# Patient Record
Sex: MALE | Race: WHITE | NOT HISPANIC OR LATINO | Employment: FULL TIME | URBAN - NONMETROPOLITAN AREA
[De-identification: names, ages, dates, MRNs, and addresses within clinical notes are randomized per-mention and may not be internally consistent; named-entity substitution may affect disease eponyms.]

---

## 2017-05-30 ENCOUNTER — TRANSCRIBE ORDERS (OUTPATIENT)
Dept: ADMINISTRATIVE | Facility: HOSPITAL | Age: 47
End: 2017-05-30

## 2017-05-30 DIAGNOSIS — R10.2 PELVIC AND PERINEAL PAIN: Primary | ICD-10-CM

## 2017-05-31 ENCOUNTER — HOSPITAL ENCOUNTER (OUTPATIENT)
Dept: CT IMAGING | Facility: HOSPITAL | Age: 47
Discharge: HOME | End: 2017-05-31
Admitting: NURSE PRACTITIONER

## 2017-05-31 DIAGNOSIS — R10.2 PELVIC AND PERINEAL PAIN: ICD-10-CM

## 2017-05-31 PROCEDURE — 72192 CT PELVIS W/O DYE: CPT | Performed by: RADIOLOGY

## 2017-05-31 PROCEDURE — 72192 CT PELVIS W/O DYE: CPT

## 2017-06-22 ENCOUNTER — OFFICE VISIT (OUTPATIENT)
Dept: CARDIOLOGY | Facility: CLINIC | Age: 47
End: 2017-06-22

## 2017-06-22 VITALS
OXYGEN SATURATION: 97 % | DIASTOLIC BLOOD PRESSURE: 89 MMHG | HEIGHT: 67 IN | WEIGHT: 209.8 LBS | HEART RATE: 82 BPM | BODY MASS INDEX: 32.93 KG/M2 | SYSTOLIC BLOOD PRESSURE: 141 MMHG

## 2017-06-22 DIAGNOSIS — I10 ESSENTIAL HYPERTENSION: ICD-10-CM

## 2017-06-22 DIAGNOSIS — I25.10 CORONARY ARTERY DISEASE INVOLVING NATIVE CORONARY ARTERY OF NATIVE HEART WITHOUT ANGINA PECTORIS: ICD-10-CM

## 2017-06-22 DIAGNOSIS — R06.02 SOB (SHORTNESS OF BREATH) ON EXERTION: ICD-10-CM

## 2017-06-22 PROCEDURE — 93000 ELECTROCARDIOGRAM COMPLETE: CPT | Performed by: PHYSICIAN ASSISTANT

## 2017-06-22 PROCEDURE — 99213 OFFICE O/P EST LOW 20 MIN: CPT | Performed by: PHYSICIAN ASSISTANT

## 2017-06-22 RX ORDER — OMEPRAZOLE 20 MG/1
CAPSULE, DELAYED RELEASE ORAL DAILY
COMMUNITY
Start: 2017-05-26 | End: 2020-01-14

## 2017-06-22 NOTE — PROGRESS NOTES
Problem list     Subjective   Salvador Panda is a 46 y.o. male     Chief Complaint   Patient presents with   • Follow-up     patient appears in office today for follow up    Problem List:  1. Coronary artery disease with history of stenting to the LAD, November 2007.  2. Hypertension      3. Dyslipidemia       HPI  The patient presents back in today for routine evaluation.  Since last appointment, he has done well from cardiac standpoint.  He denies current chest pain.  He has stable dyspnea.  He denies PND or orthopnea.  He has no palpitations, dizziness, or syncope.  He has not been exercising like he has in the past.  With that, he has slowly wrist weight gain.  He also is noted slight increase in blood pressure with weight gain.  He intends on starting exercising again soon.  He does report that he can exert however at moderate levels without any limitation whatsoever from cardiac standpoint.  The patient has no further complaints otherwise.    Current Outpatient Prescriptions   Medication Sig Dispense Refill   • aspirin 325 MG EC tablet Take  by mouth daily.     • Krill Oil 1000 MG capsule Take  by mouth.     • losartan (COZAAR) 25 MG tablet Take 1 tablet by mouth daily. 30 tablet 11   • omeprazole (priLOSEC) 20 MG capsule Daily.     • pravastatin (PRAVACHOL) 40 MG tablet Take 1 tablet by mouth daily. 30 tablet 11     No current facility-administered medications for this visit.        Penicillins and Plavix [clopidogrel]    Past Medical History:   Diagnosis Date   • Coronary artery disease    • Hyperlipidemia    • Hypertension    • Osteoarthritis        Social History     Social History   • Marital status:      Spouse name: N/A   • Number of children: N/A   • Years of education: N/A     Occupational History   • Not on file.     Social History Main Topics   • Smoking status: Never Smoker   • Smokeless tobacco: Never Used   • Alcohol use No   • Drug use: No   • Sexual activity: Defer     Other Topics  "Concern   • Not on file     Social History Narrative    Daily caffeine consumption, 2-3 servings a day       Family History   Problem Relation Age of Onset   • Hyperlipidemia Other    • Coronary artery disease Other      CABG   • Diabetes Other    • Heart attack Other    • Hypertension Mother    • Heart disease Mother    • Hyperlipidemia Mother    • Heart disease Father    • Diabetes Father    • Hypertension Father    • Hyperlipidemia Father        Review of Systems   Constitutional: Negative for fatigue.   HENT: Negative.    Eyes: Negative.  Negative for visual disturbance.   Respiratory: Positive for cough (dry cough). Negative for chest tightness, shortness of breath and wheezing.    Cardiovascular: Negative.  Negative for chest pain, palpitations and leg swelling.   Gastrointestinal: Negative.  Negative for nausea and vomiting.   Endocrine: Negative.  Negative for cold intolerance, heat intolerance, polyphagia and polyuria.   Genitourinary: Negative.  Negative for difficulty urinating, frequency and urgency.   Musculoskeletal: Positive for arthralgias (hips/legs/pelvis). Negative for back pain, myalgias, neck pain and neck stiffness.   Skin: Negative.  Negative for rash and wound.   Allergic/Immunologic: Negative.  Negative for environmental allergies and food allergies.   Neurological: Negative.  Negative for dizziness, weakness, light-headedness, numbness and headaches.   Hematological: Negative.  Does not bruise/bleed easily.   Psychiatric/Behavioral: Negative.  Negative for agitation, confusion and sleep disturbance. The patient is not nervous/anxious.        Objective   /89 (BP Location: Left arm, Patient Position: Sitting)  Pulse 82  Ht 67\" (170.2 cm)  Wt 209 lb 12.8 oz (95.2 kg)  SpO2 97%  BMI 32.86 kg/m2  Lab Results (most recent)     None        Physical Exam   Constitutional: He is oriented to person, place, and time. He appears well-developed and well-nourished. No distress.   HENT:   Head: " Normocephalic and atraumatic.   Eyes: Conjunctivae and EOM are normal. Pupils are equal, round, and reactive to light.   Neck: Normal range of motion. Neck supple. No JVD present. No tracheal deviation present.   Cardiovascular: Normal rate, regular rhythm, normal heart sounds and intact distal pulses.    Pulmonary/Chest: Effort normal and breath sounds normal.   Abdominal: Soft. Bowel sounds are normal. He exhibits no distension and no mass. There is no tenderness. There is no rebound and no guarding.   Musculoskeletal: Normal range of motion. He exhibits no edema, tenderness or deformity.   Neurological: He is alert and oriented to person, place, and time.   Skin: Skin is warm and dry. No rash noted. No erythema. No pallor.   Psychiatric: He has a normal mood and affect. His behavior is normal. Judgment and thought content normal.   Nursing note and vitals reviewed.        Procedure     ECG 12 Lead  Date/Time: 6/22/2017 9:33 AM  Performed by: PRICE GILL  Authorized by: PRICE GILL   Comments: HTN  SOB  Sinus rhythm, left axis deviation, minor nonspecific ST and T-wave changes, no acute changes noted.               Assessment/Plan      Diagnosis Plan   1. Coronary artery disease involving native coronary artery of native heart without angina pectoris     2. SOB (shortness of breath) on exertion  ECG 12 Lead   3. Essential hypertension  ECG 12 Lead     The patient seems to be doing well at this time.  I will make no changes in current medical regimen.  He will call to us for any issues.  Otherwise, we'll see him back in 6 months.

## 2017-08-24 DIAGNOSIS — E78.5 DYSLIPIDEMIA: ICD-10-CM

## 2017-08-24 DIAGNOSIS — I10 ESSENTIAL HYPERTENSION: ICD-10-CM

## 2017-08-24 RX ORDER — PRAVASTATIN SODIUM 40 MG
TABLET ORAL
Qty: 30 TABLET | Refills: 0 | Status: SHIPPED | OUTPATIENT
Start: 2017-08-24 | End: 2017-09-25 | Stop reason: SDUPTHER

## 2017-08-24 RX ORDER — LOSARTAN POTASSIUM 25 MG/1
TABLET ORAL
Qty: 30 TABLET | Refills: 0 | Status: SHIPPED | OUTPATIENT
Start: 2017-08-24 | End: 2017-09-25 | Stop reason: SDUPTHER

## 2017-09-25 DIAGNOSIS — I10 ESSENTIAL HYPERTENSION: ICD-10-CM

## 2017-09-25 DIAGNOSIS — E78.5 DYSLIPIDEMIA: ICD-10-CM

## 2017-09-25 RX ORDER — LOSARTAN POTASSIUM 25 MG/1
TABLET ORAL
Qty: 30 TABLET | Refills: 0 | Status: SHIPPED | OUTPATIENT
Start: 2017-09-25 | End: 2017-10-26 | Stop reason: SDUPTHER

## 2017-09-25 RX ORDER — PRAVASTATIN SODIUM 40 MG
TABLET ORAL
Qty: 30 TABLET | Refills: 0 | Status: SHIPPED | OUTPATIENT
Start: 2017-09-25 | End: 2017-10-26 | Stop reason: SDUPTHER

## 2017-10-26 DIAGNOSIS — E78.5 DYSLIPIDEMIA: ICD-10-CM

## 2017-10-26 DIAGNOSIS — I10 ESSENTIAL HYPERTENSION: ICD-10-CM

## 2017-10-26 RX ORDER — LOSARTAN POTASSIUM 25 MG/1
TABLET ORAL
Qty: 30 TABLET | Refills: 0 | Status: SHIPPED | OUTPATIENT
Start: 2017-10-26 | End: 2017-11-25 | Stop reason: SDUPTHER

## 2017-10-26 RX ORDER — PRAVASTATIN SODIUM 40 MG
TABLET ORAL
Qty: 30 TABLET | Refills: 0 | Status: SHIPPED | OUTPATIENT
Start: 2017-10-26 | End: 2017-11-25 | Stop reason: SDUPTHER

## 2017-11-25 DIAGNOSIS — E78.5 DYSLIPIDEMIA: ICD-10-CM

## 2017-11-25 DIAGNOSIS — I10 ESSENTIAL HYPERTENSION: ICD-10-CM

## 2017-11-26 RX ORDER — LOSARTAN POTASSIUM 25 MG/1
TABLET ORAL
Qty: 90 TABLET | Refills: 3 | Status: SHIPPED | OUTPATIENT
Start: 2017-11-26 | End: 2018-12-01 | Stop reason: SDUPTHER

## 2017-11-26 RX ORDER — PRAVASTATIN SODIUM 40 MG
TABLET ORAL
Qty: 90 TABLET | Refills: 3 | Status: SHIPPED | OUTPATIENT
Start: 2017-11-26 | End: 2018-12-01 | Stop reason: SDUPTHER

## 2018-09-25 ENCOUNTER — OFFICE VISIT (OUTPATIENT)
Dept: CARDIOLOGY | Facility: CLINIC | Age: 48
End: 2018-09-25

## 2018-09-25 VITALS
HEIGHT: 67 IN | WEIGHT: 208.8 LBS | HEART RATE: 68 BPM | SYSTOLIC BLOOD PRESSURE: 127 MMHG | BODY MASS INDEX: 32.77 KG/M2 | DIASTOLIC BLOOD PRESSURE: 78 MMHG | OXYGEN SATURATION: 98 %

## 2018-09-25 DIAGNOSIS — I25.84 CORONARY ARTERY DISEASE DUE TO CALCIFIED CORONARY LESION: ICD-10-CM

## 2018-09-25 DIAGNOSIS — I10 ESSENTIAL HYPERTENSION: Primary | ICD-10-CM

## 2018-09-25 DIAGNOSIS — R42 DIZZINESS: ICD-10-CM

## 2018-09-25 DIAGNOSIS — R06.02 SOB (SHORTNESS OF BREATH): ICD-10-CM

## 2018-09-25 DIAGNOSIS — I25.10 CORONARY ARTERY DISEASE DUE TO CALCIFIED CORONARY LESION: ICD-10-CM

## 2018-09-25 PROCEDURE — 93000 ELECTROCARDIOGRAM COMPLETE: CPT | Performed by: PHYSICIAN ASSISTANT

## 2018-09-25 PROCEDURE — 99213 OFFICE O/P EST LOW 20 MIN: CPT | Performed by: PHYSICIAN ASSISTANT

## 2018-09-25 NOTE — PATIENT INSTRUCTIONS
Heart-Healthy Eating Plan  Many factors influence your heart health, including eating and exercise habits. Heart (coronary) risk increases with abnormal blood fat (lipid) levels. Heart-healthy meal planning includes limiting unhealthy fats, increasing healthy fats, and making other small dietary changes. This includes maintaining a healthy body weight to help keep lipid levels within a normal range.  What is my plan?  Your health care provider recommends that you:  · Get no more than _________% of the total calories in your daily diet from fat.  · Limit your intake of saturated fat to less than _________% of your total calories each day.  · Limit the amount of cholesterol in your diet to less than _________ mg per day.    What types of fat should I choose?  · Choose healthy fats more often. Choose monounsaturated and polyunsaturated fats, such as olive oil and canola oil, flaxseeds, walnuts, almonds, and seeds.  · Eat more omega-3 fats. Good choices include salmon, mackerel, sardines, tuna, flaxseed oil, and ground flaxseeds. Aim to eat fish at least two times each week.  · Limit saturated fats. Saturated fats are primarily found in animal products, such as meats, butter, and cream. Plant sources of saturated fats include palm oil, palm kernel oil, and coconut oil.  · Avoid foods with partially hydrogenated oils in them. These contain trans fats. Examples of foods that contain trans fats are stick margarine, some tub margarines, cookies, crackers, and other baked goods.  What general guidelines do I need to follow?  · Check food labels carefully to identify foods with trans fats or high amounts of saturated fat.  · Fill one half of your plate with vegetables and green salads. Eat 4-5 servings of vegetables per day. A serving of vegetables equals 1 cup of raw leafy vegetables, ½ cup of raw or cooked cut-up vegetables, or ½ cup of vegetable juice.  · Fill one fourth of your plate with whole grains. Look for the word  "\"whole\" as the first word in the ingredient list.  · Fill one fourth of your plate with lean protein foods.  · Eat 4-5 servings of fruit per day. A serving of fruit equals one medium whole fruit, ¼ cup of dried fruit, ½ cup of fresh, frozen, or canned fruit, or ½ cup of 100% fruit juice.  · Eat more foods that contain soluble fiber. Examples of foods that contain this type of fiber are apples, broccoli, carrots, beans, peas, and barley. Aim to get 20-30 g of fiber per day.  · Eat more home-cooked food and less restaurant, buffet, and fast food.  · Limit or avoid alcohol.  · Limit foods that are high in starch and sugar.  · Avoid fried foods.  · Cook foods by using methods other than frying. Baking, boiling, grilling, and broiling are all great options. Other fat-reducing suggestions include:  ? Removing the skin from poultry.  ? Removing all visible fats from meats.  ? Skimming the fat off of stews, soups, and gravies before serving them.  ? Steaming vegetables in water or broth.  · Lose weight if you are overweight. Losing just 5-10% of your initial body weight can help your overall health and prevent diseases such as diabetes and heart disease.  · Increase your consumption of nuts, legumes, and seeds to 4-5 servings per week. One serving of dried beans or legumes equals ½ cup after being cooked, one serving of nuts equals 1½ ounces, and one serving of seeds equals ½ ounce or 1 tablespoon.  · You may need to monitor your salt (sodium) intake, especially if you have high blood pressure. Talk with your health care provider or dietitian to get more information about reducing sodium.  What foods can I eat?  Grains    Breads, including Gambian, white, zaki, wheat, raisin, rye, oatmeal, and Italian. Tortillas that are neither fried nor made with lard or trans fat. Low-fat rolls, including hotdog and hamburger buns and English muffins. Biscuits. Muffins. Waffles. Pancakes. Light popcorn. Whole-grain cereals. Flatbread. " Analy toast. Pretzels. Breadsticks. Rusks. Low-fat snacks and crackers, including oyster, saltine, matzo, greer, animal, and rye. Rice and pasta, including brown rice and those that are made with whole wheat.  Vegetables  All vegetables.  Fruits  All fruits, but limit coconut.  Meats and Other Protein Sources  Lean, well-trimmed beef, veal, pork, and lamb. Chicken and turkey without skin. All fish and shellfish. Wild duck, rabbit, pheasant, and venison. Egg whites or low-cholesterol egg substitutes. Dried beans, peas, lentils, and tofu. Seeds and most nuts.  Dairy  Low-fat or nonfat cheeses, including ricotta, string, and mozzarella. Skim or 1% milk that is liquid, powdered, or evaporated. Buttermilk that is made with low-fat milk. Nonfat or low-fat yogurt.  Beverages  Mineral water. Diet carbonated beverages.  Sweets and Desserts  Sherbets and fruit ices. Honey, jam, marmalade, jelly, and syrups. Meringues and gelatins. Pure sugar candy, such as hard candy, jelly beans, gumdrops, mints, marshmallows, and small amounts of dark chocolate. Armond food cake.  Eat all sweets and desserts in moderation.  Fats and Oils  Nonhydrogenated (trans-free) margarines. Vegetable oils, including soybean, sesame, sunflower, olive, peanut, safflower, corn, canola, and cottonseed. Salad dressings or mayonnaise that are made with a vegetable oil. Limit added fats and oils that you use for cooking, baking, salads, and as spreads.  Other  Cocoa powder. Coffee and tea. All seasonings and condiments.  The items listed above may not be a complete list of recommended foods or beverages. Contact your dietitian for more options.  What foods are not recommended?  Grains  Breads that are made with saturated or trans fats, oils, or whole milk. Croissants. Butter rolls. Cheese breads. Sweet rolls. Donuts. Buttered popcorn. Chow mein noodles. High-fat crackers, such as cheese or butter crackers.  Meats and Other Protein Sources  Fatty meats, such  as hotdogs, short ribs, sausage, spareribs, curiel, ribeye roast or steak, and mutton. High-fat deli meats, such as salami and bologna. Caviar. Domestic duck and goose. Organ meats, such as kidney, liver, sweetbreads, brains, gizzard, chitterlings, and heart.  Dairy  Cream, sour cream, cream cheese, and creamed cottage cheese. Whole milk cheeses, including blue (malachi), [place], Brie, Tayo, American, Havarti, Swiss, cheddar, Camembert, and [place]. Whole or 2% milk that is liquid, evaporated, or condensed. Whole buttermilk. Cream sauce or high-fat cheese sauce. Yogurt that is made from whole milk.  Beverages  Regular sodas and drinks with added sugar.  Sweets and Desserts  Frosting. Pudding. Cookies. Cakes other than terry food cake. Candy that has milk chocolate or white chocolate, hydrogenated fat, butter, coconut, or unknown ingredients. Buttered syrups. Full-fat ice cream or ice cream drinks.  Fats and Oils  Gravy that has suet, meat fat, or shortening. Cocoa butter, hydrogenated oils, palm oil, coconut oil, palm kernel oil. These can often be found in baked products, candy, fried foods, nondairy creamers, and whipped toppings. Solid fats and shortenings, including curiel fat, salt pork, lard, and butter. Nondairy cream substitutes, such as coffee creamers and sour cream substitutes. Salad dressings that are made of unknown oils, cheese, or sour cream.  The items listed above may not be a complete list of foods and beverages to avoid. Contact your dietitian for more information.  This information is not intended to replace advice given to you by your health care provider. Make sure you discuss any questions you have with your health care provider.  Document Released: 09/26/2009 Document Revised: 07/07/2017 Document Reviewed: 06/11/2015  [facility] Interactive Patient Education © 2018 [employer]

## 2018-09-25 NOTE — PROGRESS NOTES
Problem list     Subjective   Salvador Panda is a 48 y.o. male     Chief Complaint   Patient presents with   • Hypertension     presents for folllow up   • Shortness of Breath   • Dizziness   • Coronary Artery Disease       HPI      Problem List:  1. Coronary artery disease with history of stenting to the LAD, November 2007.  2. Hypertension      3. Dyslipidemia    Patient is a 48-year-old male that presents back to the office for follow-up.  He has been doing well.  He has no chest pain or pressure.  Patient does describe having moderate levels of exertional dyspnea.  This apparently has progressed to a mild degree.  He describes to me that prior to stenting in the past, his main complaint was shortness of breath as he had no significant chest pain at that time.  He does not describe any PND orthopnea.    He doesn't palpitate or have dysrhythmic symptoms.  He does complain of significant fatigue.  Describes a lack of energy.  Otherwise is doing well    Outpatient Encounter Prescriptions as of 9/25/2018   Medication Sig Dispense Refill   • aspirin 325 MG EC tablet Take  by mouth daily.     • Krill Oil 1000 MG capsule Take  by mouth.     • losartan (COZAAR) 25 MG tablet TAKE 1 TABLET BY MOUTH DAILY FOR BLOOD PRESSURE 90 tablet 3   • omeprazole (priLOSEC) 20 MG capsule Daily.     • pravastatin (PRAVACHOL) 40 MG tablet TAKE 1 TABLET BY MOUTH DAILY AT BEDTIME FOR CHOLESTEROL 90 tablet 3     No facility-administered encounter medications on file as of 9/25/2018.        Penicillins and Plavix [clopidogrel]    Past Medical History:   Diagnosis Date   • Coronary artery disease    • Hyperlipidemia    • Hypertension    • Osteoarthritis        Social History     Social History   • Marital status:      Spouse name: N/A   • Number of children: N/A   • Years of education: N/A     Occupational History   • Not on file.     Social History Main Topics   • Smoking status: Never Smoker   • Smokeless tobacco: Never Used   •  "Alcohol use No   • Drug use: No   • Sexual activity: Defer     Other Topics Concern   • Not on file     Social History Narrative    Daily caffeine consumption, 2-3 servings a day       Family History   Problem Relation Age of Onset   • Hyperlipidemia Other    • Coronary artery disease Other         CABG   • Diabetes Other    • Heart attack Other    • Hypertension Mother    • Heart disease Mother    • Hyperlipidemia Mother    • Heart disease Father    • Diabetes Father    • Hypertension Father    • Hyperlipidemia Father        Review of Systems   Constitutional: Positive for fatigue.   HENT: Positive for sore throat (acid reflux).    Eyes: Negative.    Respiratory: Positive for shortness of breath (with activity).    Cardiovascular: Negative.  Negative for chest pain, palpitations and leg swelling.   Gastrointestinal: Positive for nausea and vomiting.   Endocrine: Negative.    Genitourinary: Negative.    Musculoskeletal: Positive for myalgias (leg cramps).   Skin: Negative.    Allergic/Immunologic: Negative.    Neurological: Positive for dizziness (in the morning  ) and weakness.   Hematological: Negative.    Psychiatric/Behavioral: Positive for agitation and sleep disturbance (wakes up smothering/soa). The patient is nervous/anxious.    All other systems reviewed and are negative.      Objective   Vitals:    09/25/18 1142   BP: 127/78   BP Location: Left arm   Patient Position: Sitting   Pulse: 68   SpO2: 98%   Weight: 94.7 kg (208 lb 12.8 oz)   Height: 170.2 cm (67\")      /78 (BP Location: Left arm, Patient Position: Sitting)   Pulse 68   Ht 170.2 cm (67\")   Wt 94.7 kg (208 lb 12.8 oz)   SpO2 98%   BMI 32.70 kg/m²     Lab Results (most recent)     None          Physical Exam   Constitutional: He is oriented to person, place, and time. He appears well-developed and well-nourished. No distress.   HENT:   Head: Normocephalic and atraumatic.   Eyes: Pupils are equal, round, and reactive to light. EOM are " normal.   Neck: No JVD present.   Cardiovascular: Normal rate, regular rhythm, normal heart sounds and intact distal pulses.  Exam reveals no gallop and no friction rub.    No murmur heard.  Pulmonary/Chest: Effort normal and breath sounds normal. No respiratory distress. He has no wheezes. He has no rales. He exhibits no tenderness.   Musculoskeletal: Normal range of motion. He exhibits no edema.   Neurological: He is alert and oriented to person, place, and time. No cranial nerve deficit.   Skin: Skin is warm and dry. No rash noted. No erythema. No pallor.   Psychiatric: He has a normal mood and affect. His behavior is normal.   Nursing note and vitals reviewed.      Procedure     ECG 12 Lead  Date/Time: 9/25/2018 11:46 AM  Performed by: JELENA KHAN  Authorized by: JELENA KHAN   Comments: EKG demonstrates sinus rhythm at 62 bpm with no acute ST changes               Assessment/Plan     Problems Addressed this Visit        Cardiovascular and Mediastinum    HTN (hypertension) - Primary    Relevant Orders    ECG 12 Lead       Respiratory    SOB (shortness of breath)    Relevant Orders    ECG 12 Lead      Other Visit Diagnoses     Coronary artery disease due to calcified coronary lesion        Relevant Orders    ECG 12 Lead    Dizziness        Relevant Orders    ECG 12 Lead            Recommendation  1.  I discussed with patient to consider repeat ischemia assessment.  His symptoms prior to stenting in the past with shortness of breath and has progressed to a mild degree.  He does not want to have that done aware of her recommendation.  If symptoms continue to worsen, he will contact our office.  He feels his shortness of breath may be related to physical deconditioning.  2.  We discussed testing for sleep apnea.  He has symptoms concerning and highly suspicious for the same.  He would just like to monitor for now.  We'll see him back for follow-up in 6 months.  Follow-up with primary as scheduled               Patient's Body mass index is 32.7 kg/m². BMI is above normal parameters. Recommendations include: educational material.       Electronically signed by:

## 2018-12-01 DIAGNOSIS — I10 ESSENTIAL HYPERTENSION: ICD-10-CM

## 2018-12-01 DIAGNOSIS — E78.5 DYSLIPIDEMIA: ICD-10-CM

## 2018-12-03 DIAGNOSIS — I10 ESSENTIAL HYPERTENSION: ICD-10-CM

## 2018-12-03 DIAGNOSIS — E78.5 DYSLIPIDEMIA: ICD-10-CM

## 2018-12-03 RX ORDER — PRAVASTATIN SODIUM 40 MG
40 TABLET ORAL NIGHTLY
Qty: 90 TABLET | Refills: 3 | Status: SHIPPED | OUTPATIENT
Start: 2018-12-03 | End: 2019-12-16 | Stop reason: SDUPTHER

## 2018-12-03 RX ORDER — LOSARTAN POTASSIUM 25 MG/1
25 TABLET ORAL DAILY
Qty: 90 TABLET | Refills: 3 | Status: SHIPPED | OUTPATIENT
Start: 2018-12-03 | End: 2019-12-16 | Stop reason: SDUPTHER

## 2018-12-03 RX ORDER — LOSARTAN POTASSIUM 25 MG/1
TABLET ORAL
Qty: 90 TABLET | Refills: 2 | Status: SHIPPED | OUTPATIENT
Start: 2018-12-03 | End: 2018-12-03 | Stop reason: SDUPTHER

## 2018-12-03 RX ORDER — PRAVASTATIN SODIUM 40 MG
TABLET ORAL
Qty: 90 TABLET | Refills: 2 | Status: SHIPPED | OUTPATIENT
Start: 2018-12-03 | End: 2018-12-03 | Stop reason: SDUPTHER

## 2019-12-16 DIAGNOSIS — E78.5 DYSLIPIDEMIA: ICD-10-CM

## 2019-12-16 DIAGNOSIS — I10 ESSENTIAL HYPERTENSION: ICD-10-CM

## 2019-12-16 RX ORDER — PRAVASTATIN SODIUM 40 MG
40 TABLET ORAL NIGHTLY
Qty: 14 TABLET | Refills: 0 | Status: SHIPPED | OUTPATIENT
Start: 2019-12-16 | End: 2020-01-06 | Stop reason: SDUPTHER

## 2019-12-16 RX ORDER — LOSARTAN POTASSIUM 25 MG/1
25 TABLET ORAL DAILY
Qty: 14 TABLET | Refills: 0 | Status: SHIPPED | OUTPATIENT
Start: 2019-12-16 | End: 2020-01-06 | Stop reason: SDUPTHER

## 2019-12-16 NOTE — TELEPHONE ENCOUNTER
Rec'd a faxed request from [facility] for a refill on meds. Patient not seen since 9/2018. Will give 2 weeks of meds with msg that patient needs to schedule appt before further refills.  , ALVINA

## 2020-01-06 DIAGNOSIS — E78.5 DYSLIPIDEMIA: ICD-10-CM

## 2020-01-06 DIAGNOSIS — I10 ESSENTIAL HYPERTENSION: ICD-10-CM

## 2020-01-06 RX ORDER — PRAVASTATIN SODIUM 40 MG
40 TABLET ORAL NIGHTLY
Qty: 14 TABLET | Refills: 0 | Status: SHIPPED | OUTPATIENT
Start: 2020-01-06 | End: 2020-06-29

## 2020-01-06 RX ORDER — LOSARTAN POTASSIUM 25 MG/1
25 TABLET ORAL DAILY
Qty: 14 TABLET | Refills: 0 | Status: SHIPPED | OUTPATIENT
Start: 2020-01-06 | End: 2020-01-20

## 2020-01-06 NOTE — TELEPHONE ENCOUNTER
Rec'd a faxed request from [facility] for a refill on meds. Patient was given a 2 week supply on 12/16/19 with the msg that he needed to schedule an appt since he wasn't seen since 9/2018. Patient is scheduled for 1/14/20. Will give another 2 weeks supply.  ALVINA DE LEON

## 2020-01-14 ENCOUNTER — OFFICE VISIT (OUTPATIENT)
Dept: CARDIOLOGY | Facility: CLINIC | Age: 50
End: 2020-01-14

## 2020-01-14 VITALS
HEIGHT: 67 IN | DIASTOLIC BLOOD PRESSURE: 86 MMHG | OXYGEN SATURATION: 97 % | SYSTOLIC BLOOD PRESSURE: 141 MMHG | HEART RATE: 70 BPM | WEIGHT: 189 LBS | BODY MASS INDEX: 29.66 KG/M2

## 2020-01-14 DIAGNOSIS — E78.5 DYSLIPIDEMIA: ICD-10-CM

## 2020-01-14 DIAGNOSIS — I25.10 CORONARY ARTERY DISEASE INVOLVING NATIVE CORONARY ARTERY OF NATIVE HEART WITHOUT ANGINA PECTORIS: Primary | ICD-10-CM

## 2020-01-14 DIAGNOSIS — I10 ESSENTIAL HYPERTENSION: ICD-10-CM

## 2020-01-14 PROCEDURE — 99213 OFFICE O/P EST LOW 20 MIN: CPT | Performed by: PHYSICIAN ASSISTANT

## 2020-01-14 NOTE — PROGRESS NOTES
Problem list     Subjective   Salvador Panda is a 49 y.o. male     Chief Complaint   Patient presents with   • Hypertension     here for follow up   Problem List:  1. Coronary artery disease with history of stenting to the LAD, November 2007.  2. Hypertension      3. Dyslipidemia    HPI  The patient presents back after being out of the clinic for over a year.  He has been seen in the past because of cardiac history as above.  He has done well over the past year however.  He has gone through some stress recently but that has largely resolved.  The patient reports that outside of that, he really has done well.  He reports no chest pain at this time.  He has stable dyspnea.  He has no PND or orthopnea.  The patient has no dysrhythmic symptoms.  Blood pressures fluctuate but are largely controlled.  Unfortunately, recent lipids suggested a total cholesterol approaching 200.  His LDL was well over 100, close to 120.  Triglycerides are markedly elevated as well for this gentleman.  He reports compliance with pravastatin.  We have discussed consideration for alternate statin therapy.  In the past, the patient cannot tolerate Lipitor, Zocor, or Crestor.    Current Outpatient Medications on File Prior to Visit   Medication Sig Dispense Refill   • aspirin 325 MG EC tablet Take  by mouth daily.     • Krill Oil 1000 MG capsule Take  by mouth.     • losartan (COZAAR) 25 MG tablet Take 1 tablet by mouth Daily. Needs to schedule appt for a follow up 14 tablet 0   • pravastatin (PRAVACHOL) 40 MG tablet Take 1 tablet by mouth Every Night. Needs to schedule appt before further refills 14 tablet 0   • sertraline (ZOLOFT) 50 MG tablet Take 1 tablet by mouth Daily.     • [DISCONTINUED] omeprazole (priLOSEC) 20 MG capsule Daily.       No current facility-administered medications on file prior to visit.        Penicillins and Plavix [clopidogrel]    Past Medical History:   Diagnosis Date   • Coronary artery disease    • Hyperlipidemia      • Hypertension    • Osteoarthritis        Social History     Socioeconomic History   • Marital status:      Spouse name: Not on file   • Number of children: Not on file   • Years of education: Not on file   • Highest education level: Not on file   Tobacco Use   • Smoking status: Never Smoker   • Smokeless tobacco: Never Used   Substance and Sexual Activity   • Alcohol use: No   • Drug use: No   • Sexual activity: Defer   Social History Narrative    Daily caffeine consumption, 2-3 servings a day       Family History   Problem Relation Age of Onset   • Hyperlipidemia Other    • Coronary artery disease Other         CABG   • Diabetes Other    • Heart attack Other    • Hypertension Mother    • Heart disease Mother    • Hyperlipidemia Mother    • Heart disease Father    • Diabetes Father    • Hypertension Father    • Hyperlipidemia Father        Review of Systems   Constitutional: Negative.  Negative for fatigue.   HENT: Negative.  Negative for congestion, rhinorrhea and sore throat.    Eyes: Negative.  Negative for visual disturbance.   Respiratory: Negative.  Negative for chest tightness, shortness of breath and wheezing.    Cardiovascular: Negative.  Negative for chest pain, palpitations and leg swelling.   Gastrointestinal: Positive for abdominal pain (abd pain on right side). Negative for nausea and vomiting.   Endocrine: Negative.  Negative for cold intolerance and heat intolerance.   Genitourinary: Negative.  Negative for difficulty urinating, frequency and urgency.   Musculoskeletal: Negative.  Negative for arthralgias, back pain and neck pain.   Skin: Negative.  Negative for rash and wound.   Allergic/Immunologic: Negative.  Negative for environmental allergies and food allergies.   Neurological: Negative.  Negative for dizziness, syncope and light-headedness.   Hematological: Negative.  Does not bruise/bleed easily.   Psychiatric/Behavioral: Positive for sleep disturbance (wakes up smothering/SOA).  "Negative for agitation and confusion. The patient is nervous/anxious (occasional nervous/anxious).        Objective   Vitals:    01/14/20 1529   BP: 141/86   BP Location: Left arm   Patient Position: Sitting   Pulse: 70   SpO2: 97%   Weight: 85.7 kg (189 lb)   Height: 170.2 cm (67\")      /86 (BP Location: Left arm, Patient Position: Sitting)   Pulse 70   Ht 170.2 cm (67\")   Wt 85.7 kg (189 lb)   SpO2 97%   BMI 29.60 kg/m²    Lab Results (most recent)     None        Physical Exam   Constitutional: He is oriented to person, place, and time. He appears well-developed and well-nourished. No distress.   HENT:   Head: Normocephalic and atraumatic.   Eyes: Pupils are equal, round, and reactive to light. EOM are normal.   Neck: No JVD present.   Cardiovascular: Normal rate, regular rhythm, normal heart sounds and intact distal pulses. Exam reveals no gallop and no friction rub.   No murmur heard.  Pulmonary/Chest: Effort normal and breath sounds normal. No respiratory distress. He has no wheezes. He has no rales. He exhibits no tenderness.   Musculoskeletal: Normal range of motion. He exhibits no edema.   Neurological: He is alert and oriented to person, place, and time. No cranial nerve deficit.   Skin: Skin is warm and dry. No rash noted. No erythema. No pallor.   Psychiatric: He has a normal mood and affect. His behavior is normal.   Nursing note and vitals reviewed.        Procedure   Procedures       Assessment/Plan      Diagnosis Plan   1. Coronary artery disease involving native coronary artery of native heart without angina pectoris     2. Dyslipidemia  Evolocumab solution prefilled syringe 140 mg    Lipid Panel   3. Essential hypertension       1.  At this time, the patient is doing well from cardiovascular standpoint.  He denies symptoms of angina, failure, or dysrhythmias.    2.  I do not like his most recent lipid parameters.  He is not to goal.  He cannot tolerate alternate statin therapy as he is " failed numerous statin therapies as outlined above.  He cannot tolerate higher doses of pravastatin because of recurrence of symptoms.  In that setting, as he needs intensified lipid management, we will start him on injectable cholesterol medications.  That has been sent to his pharmacy.  He will need a repeat lipid panel in 1 to 2 months.    3.  Otherwise, I would continue medical regimen without change.  We will continue to follow lipids closely.  For any issues with hypertensive excursions, he will call immediately.  For breakthrough symptoms, which were reviewed with him today, he will call immediately as well.  Otherwise, we will continue to see him on 6 to 12-month intervals.          Patient's Body mass index is 29.6 kg/m². BMI is above normal parameters. Recommendations include: educational material and referral to primary care.             Electronically signed by:

## 2020-01-14 NOTE — PATIENT INSTRUCTIONS

## 2020-01-18 DIAGNOSIS — I10 ESSENTIAL HYPERTENSION: ICD-10-CM

## 2020-01-20 RX ORDER — LOSARTAN POTASSIUM 25 MG/1
25 TABLET ORAL DAILY
Qty: 90 TABLET | Refills: 3 | Status: SHIPPED | OUTPATIENT
Start: 2020-01-20 | End: 2022-07-07 | Stop reason: SDUPTHER

## 2020-01-23 ENCOUNTER — PRIOR AUTHORIZATION (OUTPATIENT)
Dept: CARDIOLOGY | Facility: CLINIC | Age: 50
End: 2020-01-23

## 2020-01-23 DIAGNOSIS — I25.84 CORONARY ARTERY DISEASE DUE TO CALCIFIED CORONARY LESION: ICD-10-CM

## 2020-01-23 DIAGNOSIS — I25.10 CORONARY ARTERY DISEASE DUE TO CALCIFIED CORONARY LESION: ICD-10-CM

## 2020-01-23 DIAGNOSIS — E78.5 DYSLIPIDEMIA: Primary | ICD-10-CM

## 2020-01-23 NOTE — TELEPHONE ENCOUNTER
Attempted to call patient regarding Repatha PA approval. Patient is eligible for $5 copay card and needs to  card to activate.     Ilia approved Yasmani. Vibha Mcintosh MA

## 2020-01-23 NOTE — TELEPHONE ENCOUNTER
Msg from [facility]'s that they didn't receive rx for injectable cholesterol med. Rx resent. , RMA

## 2020-02-07 ENCOUNTER — TELEPHONE (OUTPATIENT)
Dept: CARDIOLOGY | Facility: CLINIC | Age: 50
End: 2020-02-07

## 2020-02-07 NOTE — TELEPHONE ENCOUNTER
----- Message from PANTERA Cam sent at 2/7/2020  8:38 AM EST -----  The patient was not to goal on pravastatin, therefore that will be additive therapy.  He will continue pravastatin in conjunction with prescribed medication.  ----- Message -----  From: Hannah Dao  Sent: 2/5/2020  10:59 AM EST  To: PANTERA Cam/ [facility] called and stated that pt is unsure if he's supposed to be on Pravastatin and Repatha or just Repatha. She stated that pt needs Rf of Pravastatin, if he is to continue.     Per chart review, both medications are on pt's active rx list. OV note doesn't say whether to stop or continue the Pravastatin.

## 2020-02-07 NOTE — TELEPHONE ENCOUNTER
Called [facility]le stating pt is to be on both Pravastatin and Repatha and to call with any questions.

## 2020-02-14 NOTE — TELEPHONE ENCOUNTER
Patient Instructions by Adriel Blevins RN, BSN at 11/09/18 09:26 AM     Author:  Adriel Blevins RN, BSN Service:  (none) Author Type:  Physician Assistant     Filed:  11/09/18 09:37 AM Encounter Date:  11/9/2018 Status:  Signed     :  Adriel Blevins RN, BSN (Physician Assistant)            1.  Continue same medications.   2.  Continue cardiac low sodium, low cholesterol diet.   3.  Please try to exercise daily.   4.  Follow up with Dr. Correa January 2019.  5.  Let us know if you need anything.   6.  If you experience any worsening or change in symptoms, please seek emergency medical treatment.   7. Radiology scheduling #: [phone].  8. UA and BMP today.   9.  Follow-up with surgeon NP.     Thank you!  Electronically Signed by:    Adriel Blevins RN, BSN , 11/9/2018    Additional Educational Resources:  For additional resources regarding your symptoms, diagnosis, or further health information, please visit the Health Resources section on [email or web address] or the Online Health Resources section in [facility].            Revision History        User Key Date/Time User Provider Type Action    > [N/A] 11/09/18 09:37 AM Adriel Blevins RN, BSN Physician Assistant Sign             Per Ronald MOTT, refill for Pravastatin 40 mg po daily called into [facility] with 4 refills. MO Akila Murillo Mge Card [facility]   Caller: Unspecified (Today,  9:27 AM)              Bailey from [facility] needs to know if pt needs to be on pravastatin as pt has no refills and needs them if he needs to continue taking medication. She can be reached at [phone]

## 2020-03-19 ENCOUNTER — RESULTS ENCOUNTER (OUTPATIENT)
Dept: CARDIOLOGY | Facility: CLINIC | Age: 50
End: 2020-03-19

## 2020-03-19 DIAGNOSIS — E78.5 DYSLIPIDEMIA: ICD-10-CM

## 2020-06-24 ENCOUNTER — TELEPHONE (OUTPATIENT)
Dept: CARDIOLOGY | Facility: CLINIC | Age: 50
End: 2020-06-24

## 2020-06-24 NOTE — TELEPHONE ENCOUNTER
Left message for patient to return call.     Repatha approved until 1/22/2021, labs are not needed until that time. Vibha Mcintosh MA        Patient left message regarding labs needed for Repatha PA.

## 2020-06-27 DIAGNOSIS — E78.5 DYSLIPIDEMIA: ICD-10-CM

## 2020-06-29 RX ORDER — PRAVASTATIN SODIUM 40 MG
TABLET ORAL
Qty: 30 TABLET | Refills: 2 | Status: SHIPPED | OUTPATIENT
Start: 2020-06-29 | End: 2020-10-05

## 2020-06-30 ENCOUNTER — TELEPHONE (OUTPATIENT)
Dept: CARDIOLOGY | Facility: CLINIC | Age: 50
End: 2020-06-30

## 2020-06-30 NOTE — TELEPHONE ENCOUNTER
Patient called stating he thought he needed labs for Repatha prior authorization. Advised Repatha is approved until 1/22/2021. We will repeat labs on his visit in December. Vibha Mcintosh MA

## 2020-07-20 ENCOUNTER — TELEPHONE (OUTPATIENT)
Dept: CARDIOLOGY | Facility: CLINIC | Age: 50
End: 2020-07-20

## 2020-07-20 NOTE — TELEPHONE ENCOUNTER
Ilia now prefers Praluent over Repatha. [facility] shows medication requires a new PA or change to the preferred Paraluent. Verbal order per Ronald Carlton PA-C for Praluent 75 mg. [facility] to notify office if PA is required. Vibha Mcintosh MA      ----- Message from Malathi Humphreys MA sent at 7/20/2020 12:48 PM EDT -----  Regarding: Repatha  Contact: [phone]  Patient left a msg that Repatha needs a PA or switched. You have a msg that Repatha is good thru Jan 2021.

## 2020-10-02 DIAGNOSIS — E78.5 DYSLIPIDEMIA: ICD-10-CM

## 2020-10-05 RX ORDER — PRAVASTATIN SODIUM 40 MG
TABLET ORAL
Qty: 90 TABLET | Refills: 0 | Status: SHIPPED | OUTPATIENT
Start: 2020-10-05 | End: 2021-02-02

## 2021-02-01 ENCOUNTER — PRIOR AUTHORIZATION (OUTPATIENT)
Dept: CARDIOLOGY | Facility: CLINIC | Age: 51
End: 2021-02-01

## 2021-02-02 DIAGNOSIS — E78.5 DYSLIPIDEMIA: ICD-10-CM

## 2021-02-02 RX ORDER — PRAVASTATIN SODIUM 40 MG
TABLET ORAL
Qty: 30 TABLET | Refills: 1 | Status: SHIPPED | OUTPATIENT
Start: 2021-02-02 | End: 2021-04-08

## 2021-04-08 DIAGNOSIS — E78.5 DYSLIPIDEMIA: ICD-10-CM

## 2021-04-08 RX ORDER — PRAVASTATIN SODIUM 40 MG
TABLET ORAL
Qty: 30 TABLET | Refills: 5 | Status: SHIPPED | OUTPATIENT
Start: 2021-04-08 | End: 2021-10-20

## 2021-06-10 DIAGNOSIS — I25.84 CORONARY ARTERY DISEASE DUE TO CALCIFIED CORONARY LESION: ICD-10-CM

## 2021-06-10 DIAGNOSIS — E78.5 DYSLIPIDEMIA: ICD-10-CM

## 2021-06-10 DIAGNOSIS — I25.10 CORONARY ARTERY DISEASE DUE TO CALCIFIED CORONARY LESION: ICD-10-CM

## 2021-06-10 RX ORDER — EVOLOCUMAB 140 MG/ML
INJECTION, SOLUTION SUBCUTANEOUS
Qty: 2 ML | Refills: 11 | OUTPATIENT
Start: 2021-06-10

## 2021-07-22 DIAGNOSIS — E78.5 DYSLIPIDEMIA: ICD-10-CM

## 2021-07-22 DIAGNOSIS — I25.84 CORONARY ARTERY DISEASE DUE TO CALCIFIED CORONARY LESION: Primary | ICD-10-CM

## 2021-07-22 DIAGNOSIS — I25.10 CORONARY ARTERY DISEASE DUE TO CALCIFIED CORONARY LESION: Primary | ICD-10-CM

## 2021-07-22 RX ORDER — ALIROCUMAB 75 MG/ML
INJECTION, SOLUTION SUBCUTANEOUS
Qty: 2 ML | Refills: 5 | Status: SHIPPED | OUTPATIENT
Start: 2021-07-22 | End: 2022-02-25

## 2021-08-13 ENCOUNTER — OFFICE VISIT (OUTPATIENT)
Dept: CARDIOLOGY | Facility: CLINIC | Age: 51
End: 2021-08-13

## 2021-08-13 VITALS
WEIGHT: 204 LBS | HEIGHT: 67 IN | HEART RATE: 66 BPM | DIASTOLIC BLOOD PRESSURE: 80 MMHG | OXYGEN SATURATION: 97 % | BODY MASS INDEX: 32.02 KG/M2 | SYSTOLIC BLOOD PRESSURE: 122 MMHG

## 2021-08-13 DIAGNOSIS — R07.2 PRECORDIAL PAIN: ICD-10-CM

## 2021-08-13 DIAGNOSIS — I25.119 CORONARY ARTERY DISEASE INVOLVING NATIVE CORONARY ARTERY OF NATIVE HEART WITH ANGINA PECTORIS (HCC): Primary | ICD-10-CM

## 2021-08-13 DIAGNOSIS — R06.02 SHORTNESS OF BREATH: ICD-10-CM

## 2021-08-13 PROCEDURE — 93000 ELECTROCARDIOGRAM COMPLETE: CPT | Performed by: PHYSICIAN ASSISTANT

## 2021-08-13 PROCEDURE — 99213 OFFICE O/P EST LOW 20 MIN: CPT | Performed by: PHYSICIAN ASSISTANT

## 2021-08-13 RX ORDER — PANTOPRAZOLE SODIUM 40 MG/1
40 TABLET, DELAYED RELEASE ORAL DAILY
COMMUNITY

## 2021-08-13 NOTE — PATIENT INSTRUCTIONS

## 2021-08-13 NOTE — PROGRESS NOTES
Problem list     Subjective   Salvador Panda is a 51 y.o. male     Chief Complaint   Patient presents with   • Follow-up     11 months   Problem List:  1. Coronary artery disease with history of stenting to the LAD, November 2007.  2. Hypertension      3. Dyslipidemia    HPI    The patient presents back to the clinic today for routine evaluation and follow-up. We have followed him historically given coronary artery disease history as above. The patient is not been in the clinic in almost a year. He presents back today just to reestablish care, but also because of her recent symptoms. He tells me that while exerting at home, he had onset of mid back discomfort. This had referral through to the mid chest. This was concerning for him, to the point where he rested. Symptoms eventually resolved, but he is now being seen just to ensure no significant abnormalities noted. The patient has ongoing dyspnea which is stable and mostly nonlimiting. He has no failure or dysrhythmic symptoms. Blood pressures have been reasonably controlled since his primary increase losartan at 50 mg daily. Most recent lipid parameters indicate adequate control with prior 1 pravastatin. He has a degree of hypertriglyceridemia, but intends on intensifying lifestyle changes to address that.  Current Outpatient Medications on File Prior to Visit   Medication Sig Dispense Refill   • aspirin 325 MG EC tablet Take  by mouth daily.     • Krill Oil 1000 MG capsule Take  by mouth.     • losartan (COZAAR) 25 MG tablet Take 1 tablet by mouth Daily. (Patient taking differently: Take 50 mg by mouth Daily.) 90 tablet 3   • pantoprazole (PROTONIX) 40 MG EC tablet Take 40 mg by mouth Daily.     • Praluent 75 MG/ML solution auto-injector INJECT 75 MG UNDER SKIN EVERY 14 DAYS 2 mL 5   • pravastatin (PRAVACHOL) 40 MG tablet TAKE 1 TABLET BY MOUTH ONCE DAILY 30 tablet 5   • sertraline (ZOLOFT) 50 MG tablet Take 1 tablet by mouth Daily.       No current  facility-administered medications on file prior to visit.       Plavix [clopidogrel] and Penicillins    Past Medical History:   Diagnosis Date   • Coronary artery disease    • Hyperlipidemia    • Hypertension    • Osteoarthritis        Social History     Socioeconomic History   • Marital status:      Spouse name: Not on file   • Number of children: Not on file   • Years of education: Not on file   • Highest education level: Not on file   Tobacco Use   • Smoking status: Never Smoker   • Smokeless tobacco: Never Used   Substance and Sexual Activity   • Alcohol use: No   • Drug use: No   • Sexual activity: Defer       Family History   Problem Relation Age of Onset   • Hyperlipidemia Other    • Coronary artery disease Other         CABG   • Diabetes Other    • Heart attack Other    • Hypertension Mother    • Heart disease Mother    • Hyperlipidemia Mother    • Heart disease Father    • Diabetes Father    • Hypertension Father    • Hyperlipidemia Father        Review of Systems   Constitutional: Negative.  Negative for chills, fatigue and fever.   HENT: Negative for congestion, rhinorrhea and sore throat.    Eyes: Negative.  Negative for visual disturbance.   Respiratory: Positive for chest tightness and shortness of breath. Negative for wheezing.    Cardiovascular: Negative for chest pain, palpitations and leg swelling.   Gastrointestinal: Negative.    Endocrine: Negative.    Genitourinary: Negative.    Musculoskeletal: Positive for back pain and neck pain. Negative for arthralgias.   Skin: Negative.  Negative for rash and wound.   Allergic/Immunologic: Positive for environmental allergies.   Neurological: Positive for headaches. Negative for dizziness, weakness and numbness.   Hematological: Negative.  Does not bruise/bleed easily.   Psychiatric/Behavioral: Positive for sleep disturbance (falling / staying asleeep ).       Objective   Vitals:    08/13/21 0900   BP: 122/80   BP Location: Left arm   Patient  "Position: Sitting   Pulse: 66   SpO2: 97%   Weight: 92.5 kg (204 lb)   Height: 170.2 cm (67\")      /80 (BP Location: Left arm, Patient Position: Sitting)   Pulse 66   Ht 170.2 cm (67\")   Wt 92.5 kg (204 lb)   SpO2 97%   BMI 31.95 kg/m²    Lab Results (most recent)     None        Physical Exam  Vitals and nursing note reviewed.   Constitutional:       General: He is not in acute distress.     Appearance: He is well-developed.   HENT:      Head: Normocephalic and atraumatic.   Eyes:      Conjunctiva/sclera: Conjunctivae normal.      Pupils: Pupils are equal, round, and reactive to light.   Neck:      Vascular: No JVD.      Trachea: No tracheal deviation.   Cardiovascular:      Rate and Rhythm: Normal rate and regular rhythm.      Heart sounds: Normal heart sounds.   Pulmonary:      Effort: Pulmonary effort is normal.      Breath sounds: Normal breath sounds.   Abdominal:      General: Bowel sounds are normal. There is no distension.      Palpations: Abdomen is soft. There is no mass.      Tenderness: There is no abdominal tenderness. There is no guarding or rebound.   Musculoskeletal:         General: No tenderness or deformity. Normal range of motion.      Cervical back: Normal range of motion and neck supple.   Skin:     General: Skin is warm and dry.      Coloration: Skin is not pale.      Findings: No erythema or rash.   Neurological:      Mental Status: He is alert and oriented to person, place, and time.   Psychiatric:         Behavior: Behavior normal.         Thought Content: Thought content normal.         Judgment: Judgment normal.           Procedure     ECG 12 Lead    Date/Time: 8/13/2021 9:05 AM  Performed by: Ronald Carlton PA  Authorized by: Ronald Carlton PA   Comparison: compared with previous ECG from 1/2/2020  Comparison to previous ECG: Sinus rhythm, rate 54, no acute changes noted. The patient does have diffuse nonspecific ST-T wave changes.                 Assessment/Plan      " Diagnosis Plan   1. Coronary artery disease involving native coronary artery of native heart with angina pectoris (CMS/HCC)     2. Precordial pain     3. Shortness of breath       1. I would like to repeat cardiac evaluation. The patient describes a recent episode of chest discomfort, all as outlined above. With cardiac history, I feel he needs evaluation with a stress test. We will schedule for treadmill nuclear stress testing in that setting.    2. We will schedule for an echo as well to evaluate LV size and function, valvular morphologies, and cardiac structure otherwise.    3. Blood pressures have improved since recent increase of losartan therapy with the patient's primary care provider. He will continue to monitor that and call for any complications.    4. Recent lipid parameters suggest largely normal lipid findings with the exception of hypertriglyceridemia. I would continue prior 1 pravastatin without change. He will institute lifestyle modifications to address his degree of hypertriglyceridemia.    5. I would make no adjustments. We will see him back to review results of the above and recommend him further. He will call for any complications.                   Electronically signed by:

## 2021-10-20 DIAGNOSIS — E78.5 DYSLIPIDEMIA: ICD-10-CM

## 2021-10-20 RX ORDER — PRAVASTATIN SODIUM 40 MG
TABLET ORAL
Qty: 30 TABLET | Refills: 5 | Status: SHIPPED | OUTPATIENT
Start: 2021-10-20 | End: 2022-04-19

## 2021-11-23 ENCOUNTER — TELEPHONE (OUTPATIENT)
Dept: CARDIOLOGY | Facility: CLINIC | Age: 51
End: 2021-11-23

## 2021-11-23 DIAGNOSIS — R07.2 PRECORDIAL PAIN: Primary | ICD-10-CM

## 2021-11-23 DIAGNOSIS — R06.02 SHORTNESS OF BREATH: ICD-10-CM

## 2021-11-23 NOTE — TELEPHONE ENCOUNTER
Pt called stated that pt was having chest pain,  Nausea, dizziness, and tingling in Left arm. Stated that he was supposed to have been scheduled for Echo and Stress test never got a phone call with appts.    Advised  Pt. to go to the ER  To be evaluated. Pt refused said that his symptoms were from Friday night and that he felt better. He stated he just wanted his Stress and Echo appts. I advised if symptoms return or worsen to go to the ER for evaluation. I also advised pt the scheduling would be calling him back with the appts.

## 2022-01-06 ENCOUNTER — APPOINTMENT (OUTPATIENT)
Dept: CARDIOLOGY | Facility: HOSPITAL | Age: 52
End: 2022-01-06

## 2022-02-08 ENCOUNTER — HOSPITAL ENCOUNTER (OUTPATIENT)
Dept: CARDIOLOGY | Facility: HOSPITAL | Age: 52
Discharge: HOME | End: 2022-02-08

## 2022-02-08 DIAGNOSIS — R06.02 SHORTNESS OF BREATH: ICD-10-CM

## 2022-02-08 DIAGNOSIS — R07.2 PRECORDIAL PAIN: ICD-10-CM

## 2022-02-08 PROCEDURE — 93306 TTE W/DOPPLER COMPLETE: CPT | Performed by: SPECIALIST

## 2022-02-08 PROCEDURE — 25010000002 REGADENOSON 0.4 MG/5ML SOLUTION: Performed by: INTERNAL MEDICINE

## 2022-02-08 PROCEDURE — A9500 TC99M SESTAMIBI: HCPCS | Performed by: INTERNAL MEDICINE

## 2022-02-08 PROCEDURE — 25010000002 AMINOPHYLLINE PER 250 MG: Performed by: INTERNAL MEDICINE

## 2022-02-08 PROCEDURE — 78452 HT MUSCLE IMAGE SPECT MULT: CPT

## 2022-02-08 PROCEDURE — 93018 CV STRESS TEST I&R ONLY: CPT | Performed by: INTERNAL MEDICINE

## 2022-02-08 PROCEDURE — 0 TECHNETIUM SESTAMIBI: Performed by: INTERNAL MEDICINE

## 2022-02-08 PROCEDURE — 93306 TTE W/DOPPLER COMPLETE: CPT

## 2022-02-08 PROCEDURE — 93017 CV STRESS TEST TRACING ONLY: CPT

## 2022-02-08 PROCEDURE — 78452 HT MUSCLE IMAGE SPECT MULT: CPT | Performed by: INTERNAL MEDICINE

## 2022-02-08 RX ORDER — AMINOPHYLLINE DIHYDRATE 25 MG/ML
125 INJECTION, SOLUTION INTRAVENOUS
Status: COMPLETED | OUTPATIENT
Start: 2022-02-08 | End: 2022-02-08

## 2022-02-08 RX ADMIN — REGADENOSON 0.4 MG: 0.08 INJECTION, SOLUTION INTRAVENOUS at 10:10

## 2022-02-08 RX ADMIN — AMINOPHYLLINE 125 MG: 25 INJECTION, SOLUTION INTRAVENOUS at 10:11

## 2022-02-08 RX ADMIN — TECHNETIUM TC 99M SESTAMIBI 1 DOSE: 1 INJECTION INTRAVENOUS at 08:17

## 2022-02-08 RX ADMIN — TECHNETIUM TC 99M SESTAMIBI 1 DOSE: 1 INJECTION INTRAVENOUS at 10:11

## 2022-02-10 LAB
BH CV REST NUCLEAR ISOTOPE DOSE: 10 MCI
BH CV STRESS COMMENTS STAGE 1: NORMAL
BH CV STRESS DOSE REGADENOSON STAGE 1: 0.4
BH CV STRESS DURATION MIN STAGE 1: 0
BH CV STRESS DURATION SEC STAGE 1: 10
BH CV STRESS NUCLEAR ISOTOPE DOSE: 30 MCI
BH CV STRESS PROTOCOL 1: NORMAL
BH CV STRESS RECOVERY BP: NORMAL MMHG
BH CV STRESS RECOVERY HR: 59 BPM
BH CV STRESS STAGE 1: 1
MAXIMAL PREDICTED HEART RATE: 169 BPM
PERCENT MAX PREDICTED HR: 37.28 %
STRESS BASELINE BP: NORMAL MMHG
STRESS BASELINE HR: 53 BPM
STRESS PERCENT HR: 44 %
STRESS POST PEAK BP: NORMAL MMHG
STRESS POST PEAK HR: 63 BPM
STRESS TARGET HR: 144 BPM

## 2022-02-15 ENCOUNTER — TELEPHONE (OUTPATIENT)
Dept: CARDIOLOGY | Facility: CLINIC | Age: 52
End: 2022-02-15

## 2022-02-15 NOTE — TELEPHONE ENCOUNTER
Ronald  Patient said that they were called this morning about abn test results . I see where patient had abnormal stress. What is your plan of action and I can call them back please?

## 2022-02-17 ENCOUNTER — OFFICE VISIT (OUTPATIENT)
Dept: CARDIOLOGY | Facility: CLINIC | Age: 52
End: 2022-02-17

## 2022-02-17 VITALS
SYSTOLIC BLOOD PRESSURE: 143 MMHG | WEIGHT: 206 LBS | BODY MASS INDEX: 32.33 KG/M2 | DIASTOLIC BLOOD PRESSURE: 95 MMHG | HEART RATE: 68 BPM | HEIGHT: 67 IN | OXYGEN SATURATION: 96 %

## 2022-02-17 DIAGNOSIS — I10 PRIMARY HYPERTENSION: ICD-10-CM

## 2022-02-17 DIAGNOSIS — R06.02 SHORTNESS OF BREATH: ICD-10-CM

## 2022-02-17 DIAGNOSIS — I25.10 CORONARY ARTERY DISEASE INVOLVING NATIVE CORONARY ARTERY OF NATIVE HEART WITHOUT ANGINA PECTORIS: Primary | ICD-10-CM

## 2022-02-17 LAB
BH CV ECHO MEAS - ACS: 2.5 CM
BH CV ECHO MEAS - AO MAX PG: 5.9 MMHG
BH CV ECHO MEAS - AO MEAN PG: 3 MMHG
BH CV ECHO MEAS - AO ROOT AREA (BSA CORRECTED): 1.7
BH CV ECHO MEAS - AO ROOT AREA: 9.6 CM^2
BH CV ECHO MEAS - AO ROOT DIAM: 3.5 CM
BH CV ECHO MEAS - AO V2 MAX: 121 CM/SEC
BH CV ECHO MEAS - AO V2 MEAN: 85.6 CM/SEC
BH CV ECHO MEAS - AO V2 VTI: 22.9 CM
BH CV ECHO MEAS - BSA(HAYCOCK): 2.1 M^2
BH CV ECHO MEAS - BSA: 2 M^2
BH CV ECHO MEAS - BZI_BMI: 32 KILOGRAMS/M^2
BH CV ECHO MEAS - BZI_METRIC_HEIGHT: 170.2 CM
BH CV ECHO MEAS - BZI_METRIC_WEIGHT: 92.5 KG
BH CV ECHO MEAS - EDV(CUBED): 60.7 ML
BH CV ECHO MEAS - EDV(MOD-SP4): 62.8 ML
BH CV ECHO MEAS - EDV(TEICH): 67.1 ML
BH CV ECHO MEAS - EF(CUBED): 78.9 %
BH CV ECHO MEAS - EF(MOD-SP4): 54.1 %
BH CV ECHO MEAS - EF(TEICH): 71.8 %
BH CV ECHO MEAS - EF_3D-VOL: 57 %
BH CV ECHO MEAS - ESV(CUBED): 12.8 ML
BH CV ECHO MEAS - ESV(MOD-SP4): 28.8 ML
BH CV ECHO MEAS - ESV(TEICH): 18.9 ML
BH CV ECHO MEAS - FS: 40.5 %
BH CV ECHO MEAS - IVS/LVPW: 1.3
BH CV ECHO MEAS - IVSD: 1.2 CM
BH CV ECHO MEAS - LA DIMENSION: 3.4 CM
BH CV ECHO MEAS - LA/AO: 0.97
BH CV ECHO MEAS - LV DIASTOLIC VOL/BSA (35-75): 30.8 ML/M^2
BH CV ECHO MEAS - LV IVRT: 0.14 SEC
BH CV ECHO MEAS - LV MASS(C)D: 134.2 GRAMS
BH CV ECHO MEAS - LV MASS(C)DI: 65.8 GRAMS/M^2
BH CV ECHO MEAS - LV SYSTOLIC VOL/BSA (12-30): 14.1 ML/M^2
BH CV ECHO MEAS - LVIDD: 3.9 CM
BH CV ECHO MEAS - LVIDS: 2.3 CM
BH CV ECHO MEAS - LVLD AP4: 6.4 CM
BH CV ECHO MEAS - LVLS AP4: 5.1 CM
BH CV ECHO MEAS - LVOT AREA (M): 4.2 CM^2
BH CV ECHO MEAS - LVOT AREA: 4.2 CM^2
BH CV ECHO MEAS - LVOT DIAM: 2.3 CM
BH CV ECHO MEAS - LVPWD: 0.94 CM
BH CV ECHO MEAS - MV A MAX VEL: 59.1 CM/SEC
BH CV ECHO MEAS - MV DEC SLOPE: 179 CM/SEC^2
BH CV ECHO MEAS - MV E MAX VEL: 51.4 CM/SEC
BH CV ECHO MEAS - MV E/A: 0.87
BH CV ECHO MEAS - RVDD: 3.3 CM
BH CV ECHO MEAS - SI(AO): 108 ML/M^2
BH CV ECHO MEAS - SI(CUBED): 23.5 ML/M^2
BH CV ECHO MEAS - SI(MOD-SP4): 16.7 ML/M^2
BH CV ECHO MEAS - SI(TEICH): 23.6 ML/M^2
BH CV ECHO MEAS - SV(AO): 220.3 ML
BH CV ECHO MEAS - SV(CUBED): 47.9 ML
BH CV ECHO MEAS - SV(MOD-SP4): 34 ML
BH CV ECHO MEAS - SV(TEICH): 48.2 ML
MAXIMAL PREDICTED HEART RATE: 169 BPM
STRESS TARGET HR: 144 BPM

## 2022-02-17 PROCEDURE — 99213 OFFICE O/P EST LOW 20 MIN: CPT | Performed by: PHYSICIAN ASSISTANT

## 2022-02-17 NOTE — PATIENT INSTRUCTIONS

## 2022-02-17 NOTE — PROGRESS NOTES
Problem list     Subjective   Salvador Panda is a 51 y.o. male     Chief Complaint   Patient presents with   • Follow-up     ABN stress nuclear    Problem List:  1. Coronary artery disease with history of stenting to the LAD, November 2007.  2. Hypertension      3. Dyslipidemia    HPI    The patient presents in the clinic today at our request to review stress test findings.  Because of symptoms reported at last evaluation, not symptoms he had experience with stenting previously, he was scheduled for noninvasive cardiac evaluation.  Echocardiogram prelim only is available today.  His stress test however supports a mild anterolateral wall segment of ischemia.  Clinically, the patient now feels well.  He experiences no continuation of chest pain.  He reports stable dyspnea.  He reports no failure nor dysrhythmic symptoms.  He again reports no symptoms whatsoever with which he experienced prior to stenting in 2007.  Currently, the patient feels that he is doing well.  Current Outpatient Medications on File Prior to Visit   Medication Sig Dispense Refill   • aspirin 325 MG EC tablet Take  by mouth daily.     • Krill Oil 1000 MG capsule Take  by mouth.     • losartan (COZAAR) 25 MG tablet Take 1 tablet by mouth Daily. (Patient taking differently: Take 50 mg by mouth Daily.) 90 tablet 3   • pantoprazole (PROTONIX) 40 MG EC tablet Take 40 mg by mouth Daily.     • Praluent 75 MG/ML solution auto-injector INJECT 75 MG UNDER SKIN EVERY 14 DAYS 2 mL 5   • pravastatin (PRAVACHOL) 40 MG tablet TAKE 1 TABLET BY MOUTH ONCE DAILY 30 tablet 5   • sertraline (ZOLOFT) 50 MG tablet Take 1 tablet by mouth Daily.       No current facility-administered medications on file prior to visit.       Plavix [clopidogrel] and Penicillins    Past Medical History:   Diagnosis Date   • Coronary artery disease    • Hyperlipidemia    • Hypertension    • Osteoarthritis        Social History     Socioeconomic History   • Marital status:   "  Tobacco Use   • Smoking status: Never Smoker   • Smokeless tobacco: Never Used   Substance and Sexual Activity   • Alcohol use: No   • Drug use: No   • Sexual activity: Defer       Family History   Problem Relation Age of Onset   • Hyperlipidemia Other    • Coronary artery disease Other         CABG   • Diabetes Other    • Heart attack Other    • Hypertension Mother    • Heart disease Mother    • Hyperlipidemia Mother    • Heart disease Father    • Diabetes Father    • Hypertension Father    • Hyperlipidemia Father        Review of Systems   Constitutional: Negative.  Negative for chills, fatigue and fever.   HENT: Negative.  Negative for congestion, rhinorrhea and sore throat.    Eyes: Negative.  Negative for visual disturbance.   Respiratory: Negative.  Negative for chest tightness, shortness of breath and wheezing.    Cardiovascular: Negative.  Negative for chest pain, palpitations and leg swelling.   Gastrointestinal: Negative.    Endocrine: Negative.    Genitourinary: Negative.    Musculoskeletal: Negative.  Negative for arthralgias, back pain and neck pain.   Skin: Negative.  Negative for rash and wound.   Allergic/Immunologic: Positive for environmental allergies.   Neurological: Negative.  Negative for dizziness, weakness, numbness and headaches.   Hematological: Negative.  Does not bruise/bleed easily.   Psychiatric/Behavioral: Negative.  Negative for sleep disturbance.       Objective   Vitals:    02/17/22 0848   BP: 143/95   BP Location: Left arm   Patient Position: Sitting   Pulse: 68   SpO2: 96%   Weight: 93.4 kg (206 lb)   Height: 170.2 cm (67\")      /95 (BP Location: Left arm, Patient Position: Sitting)   Pulse 68   Ht 170.2 cm (67\")   Wt 93.4 kg (206 lb)   SpO2 96%   BMI 32.26 kg/m²    Lab Results (most recent)     None        Physical Exam  Vitals and nursing note reviewed.   Constitutional:       General: He is not in acute distress.     Appearance: He is well-developed.   HENT:      " Head: Normocephalic and atraumatic.   Eyes:      Conjunctiva/sclera: Conjunctivae normal.      Pupils: Pupils are equal, round, and reactive to light.   Neck:      Vascular: No JVD.      Trachea: No tracheal deviation.   Cardiovascular:      Rate and Rhythm: Normal rate and regular rhythm.      Heart sounds: Normal heart sounds.   Pulmonary:      Effort: Pulmonary effort is normal.      Breath sounds: Normal breath sounds.   Abdominal:      General: Bowel sounds are normal. There is no distension.      Palpations: Abdomen is soft. There is no mass.      Tenderness: There is no abdominal tenderness. There is no guarding or rebound.   Musculoskeletal:         General: No tenderness or deformity. Normal range of motion.      Cervical back: Normal range of motion and neck supple.   Skin:     General: Skin is warm and dry.      Coloration: Skin is not pale.      Findings: No erythema or rash.   Neurological:      Mental Status: He is alert and oriented to person, place, and time.   Psychiatric:         Behavior: Behavior normal.         Thought Content: Thought content normal.         Judgment: Judgment normal.           Procedure   Procedures       Assessment/Plan      Diagnosis Plan   1. Coronary artery disease involving native coronary artery of native heart without angina pectoris     2. Shortness of breath     3. Primary hypertension       1.  The patient presents today to review stress test findings.  Stress test questions a small segment of anterolateral wall ischemia.  We have reviewed this in detail with the patient.  He is asymptomatic from cardiovascular standpoint.  Given stable clinical course and nonhigh risk stress test, we have collectively agreed to proceed with medical management and clinical monitoring.    2.  For breakthrough symptoms, he will have to be considered for catheterization at that time.  For now, I would continue medications without change.    3.  We will see him back in 2 to 4 months just  for repeat symptom check.  Again, he will call for complications prior to that.    4.  He is hypertensive today.  He feels that this is related more to stress.  He is going to monitor that at home.  If needed, I will increase losartan dosing.                          Electronically signed by:

## 2022-02-24 ENCOUNTER — TELEPHONE (OUTPATIENT)
Dept: CARDIOLOGY | Facility: CLINIC | Age: 52
End: 2022-02-24

## 2022-02-24 NOTE — TELEPHONE ENCOUNTER
Patient  would like Rx for Nitro to keep on hand if needed. He is not having any symptoms at this time.    Ok per Ronald MOTT to call in Nitro. Sent into patient pharmacy.

## 2022-02-25 DIAGNOSIS — I25.84 CORONARY ARTERY DISEASE DUE TO CALCIFIED CORONARY LESION: ICD-10-CM

## 2022-02-25 DIAGNOSIS — E78.5 DYSLIPIDEMIA: ICD-10-CM

## 2022-02-25 DIAGNOSIS — I25.10 CORONARY ARTERY DISEASE DUE TO CALCIFIED CORONARY LESION: ICD-10-CM

## 2022-02-25 RX ORDER — ALIROCUMAB 75 MG/ML
INJECTION, SOLUTION SUBCUTANEOUS
Qty: 6 PEN | Refills: 3 | Status: SHIPPED | OUTPATIENT
Start: 2022-02-25 | End: 2023-03-22

## 2022-02-25 RX ORDER — NITROGLYCERIN 0.4 MG/1
TABLET SUBLINGUAL
Qty: 100 TABLET | Refills: 11 | Status: SHIPPED | OUTPATIENT
Start: 2022-02-25

## 2022-03-02 ENCOUNTER — PRIOR AUTHORIZATION (OUTPATIENT)
Dept: CARDIOLOGY | Facility: CLINIC | Age: 52
End: 2022-03-02

## 2022-03-02 NOTE — TELEPHONE ENCOUNTER
Praluent PA approval.  Message from [employer]...Your PA request has been approved. Additional information will be provided in the approval communication

## 2022-04-19 DIAGNOSIS — E78.5 DYSLIPIDEMIA: ICD-10-CM

## 2022-04-19 RX ORDER — PRAVASTATIN SODIUM 40 MG
TABLET ORAL
Qty: 30 TABLET | Refills: 5 | Status: SHIPPED | OUTPATIENT
Start: 2022-04-19 | End: 2022-11-08

## 2022-07-07 ENCOUNTER — TELEPHONE (OUTPATIENT)
Dept: CARDIOLOGY | Facility: CLINIC | Age: 52
End: 2022-07-07

## 2022-07-07 DIAGNOSIS — I10 ESSENTIAL HYPERTENSION: ICD-10-CM

## 2022-07-07 RX ORDER — LOSARTAN POTASSIUM 50 MG/1
50 TABLET ORAL DAILY
Qty: 30 TABLET | Refills: 5 | Status: SHIPPED | OUTPATIENT
Start: 2022-07-07

## 2022-07-07 NOTE — TELEPHONE ENCOUNTER
Patients [employer]friends called requestion medication refil.    Called and spoke with patient, he needs refill of Losartan 50 mg daily (Also called and confirmed with px ,this is the amount he has been taking) Patient notified sent to [facility].      RYLIE RUIZ MA

## 2022-11-08 DIAGNOSIS — E78.5 DYSLIPIDEMIA: ICD-10-CM

## 2022-11-08 RX ORDER — PRAVASTATIN SODIUM 40 MG
TABLET ORAL
Qty: 30 TABLET | Refills: 5 | Status: SHIPPED | OUTPATIENT
Start: 2022-11-08

## 2023-02-01 ENCOUNTER — PRIOR AUTHORIZATION (OUTPATIENT)
Dept: CARDIOLOGY | Facility: CLINIC | Age: 53
End: 2023-02-01
Payer: COMMERCIAL

## 2023-03-22 DIAGNOSIS — E78.5 DYSLIPIDEMIA: ICD-10-CM

## 2023-03-22 DIAGNOSIS — I25.84 CORONARY ARTERY DISEASE DUE TO CALCIFIED CORONARY LESION: ICD-10-CM

## 2023-03-22 DIAGNOSIS — I25.10 CORONARY ARTERY DISEASE DUE TO CALCIFIED CORONARY LESION: ICD-10-CM

## 2023-03-22 RX ORDER — ALIROCUMAB 75 MG/ML
INJECTION, SOLUTION SUBCUTANEOUS
Qty: 6 ML | Refills: 0 | Status: SHIPPED | OUTPATIENT
Start: 2023-03-22

## 2023-04-28 DIAGNOSIS — E78.5 DYSLIPIDEMIA: ICD-10-CM

## 2023-04-28 RX ORDER — PRAVASTATIN SODIUM 40 MG
TABLET ORAL
Qty: 30 TABLET | Refills: 0 | Status: SHIPPED | OUTPATIENT
Start: 2023-04-28

## 2023-06-09 DIAGNOSIS — E78.5 DYSLIPIDEMIA: ICD-10-CM

## 2023-06-11 DIAGNOSIS — I25.84 CORONARY ARTERY DISEASE DUE TO CALCIFIED CORONARY LESION: ICD-10-CM

## 2023-06-11 DIAGNOSIS — I25.10 CORONARY ARTERY DISEASE DUE TO CALCIFIED CORONARY LESION: ICD-10-CM

## 2023-06-11 DIAGNOSIS — E78.5 DYSLIPIDEMIA: ICD-10-CM

## 2023-06-12 ENCOUNTER — TELEPHONE (OUTPATIENT)
Dept: CARDIOLOGY | Facility: CLINIC | Age: 53
End: 2023-06-12
Payer: COMMERCIAL

## 2023-06-12 DIAGNOSIS — E78.5 DYSLIPIDEMIA: ICD-10-CM

## 2023-06-12 DIAGNOSIS — I25.84 CORONARY ARTERY DISEASE DUE TO CALCIFIED CORONARY LESION: ICD-10-CM

## 2023-06-12 DIAGNOSIS — I25.10 CORONARY ARTERY DISEASE DUE TO CALCIFIED CORONARY LESION: ICD-10-CM

## 2023-06-12 RX ORDER — ALIROCUMAB 75 MG/ML
INJECTION, SOLUTION SUBCUTANEOUS
Qty: 2 ML | Refills: 2 | OUTPATIENT
Start: 2023-06-12

## 2023-06-12 RX ORDER — ALIROCUMAB 75 MG/ML
75 INJECTION, SOLUTION SUBCUTANEOUS
Qty: 6 ML | Refills: 0 | Status: SHIPPED | OUTPATIENT
Start: 2023-06-12

## 2023-06-12 RX ORDER — PRAVASTATIN SODIUM 40 MG
TABLET ORAL
Qty: 30 TABLET | Refills: 0 | OUTPATIENT
Start: 2023-06-12

## 2023-06-12 NOTE — TELEPHONE ENCOUNTER
Caller: Salvador Panda    Relationship to patient: Self    Best call back number: [phone]    Type of visit: F/U    Requested date: IS FINE WITH THE APPT ON 1/23/24, AS LONG AS HE CAN GET HIS MEDICATION    Additional notes: PT PUT IN A REFILL REQUEST FOR  Praluent 75 MG/ML   pravastatin (PRAVACHOL) 40 MG  AND WAS DENIED DUE TO NEEDING AN APPT IN OUR OFFICE. WE GOT HIM SCHEDULED FOR 1/23/24, WHICH WAS THE NEXT  AVAILABLE FOR [facility]. HE IS FEELING FINE AND IS OKAY WITH THIS APPT AS LONG AS HE CAN GET HIS MEDICATION REFILLED.  HE HAS BEEN OUT OF THIS MEDICATION SINCE 6/8/23.

## 2023-06-14 DIAGNOSIS — E78.5 DYSLIPIDEMIA: Primary | ICD-10-CM

## 2023-06-15 DIAGNOSIS — E78.5 DYSLIPIDEMIA: Primary | ICD-10-CM

## 2024-01-23 ENCOUNTER — OFFICE VISIT (OUTPATIENT)
Dept: CARDIOLOGY | Facility: CLINIC | Age: 54
End: 2024-01-23
Payer: COMMERCIAL

## 2024-01-23 DIAGNOSIS — I10 ESSENTIAL HYPERTENSION: ICD-10-CM

## 2024-01-23 DIAGNOSIS — I25.10 CORONARY ARTERY DISEASE DUE TO CALCIFIED CORONARY LESION: ICD-10-CM

## 2024-01-23 DIAGNOSIS — I25.84 CORONARY ARTERY DISEASE DUE TO CALCIFIED CORONARY LESION: ICD-10-CM

## 2024-01-23 DIAGNOSIS — E78.5 DYSLIPIDEMIA: ICD-10-CM

## 2024-01-23 PROCEDURE — 93000 ELECTROCARDIOGRAM COMPLETE: CPT | Performed by: PHYSICIAN ASSISTANT

## 2024-01-23 PROCEDURE — 99213 OFFICE O/P EST LOW 20 MIN: CPT | Performed by: PHYSICIAN ASSISTANT

## 2024-01-23 RX ORDER — ALIROCUMAB 75 MG/ML
75 INJECTION, SOLUTION SUBCUTANEOUS
Qty: 6 ML | Refills: 12 | Status: SHIPPED | OUTPATIENT
Start: 2024-01-23 | End: 2024-01-24 | Stop reason: ALTCHOICE

## 2024-01-23 RX ORDER — BUSPIRONE HYDROCHLORIDE 7.5 MG/1
1 TABLET ORAL 2 TIMES DAILY
COMMUNITY

## 2024-01-23 NOTE — PROGRESS NOTES
Problem list     Subjective   Salvador Panda is a 53 y.o. male     Chief Complaint   Patient presents with    Follow-up     Refill on praluent.   Problem List:  1. Coronary artery disease with history of stenting to the LAD, 2007.  2. Hypertension      3. Dyslipidemia    HPI  The patient presents in clinic today for reestablishment of cardiovascular care.  He was seen at last evaluation where he was reviewed with stress test findings.  Stress test questioned a small segment of anterolateral wall ischemia.  No high risk markers were noted.  He was clinically stable.  He did not want to pursue catheterization and opted for medical management only.  He has done well with that decision.  He presents back in almost 2 years later.  He has not taken Praluent for some time, as evidenced by recent lipids with LDL well over 100, as he did not have refills.  We have given him that today and advised to call us should refills  and we would refill that accordingly.  He has had no chest pain.  He has stable dyspnea.  He has no failure nor dysrhythmic symptoms.  The patient really has felt well and has no specific complaints.  We did advise consideration for updated stress test just to ensure no increasing ischemic burden or risk otherwise.  He feels so well that he would really prefer to avoid that.    Current Outpatient Medications on File Prior to Visit   Medication Sig Dispense Refill    aspirin 325 MG EC tablet Take  by mouth daily.      busPIRone (BUSPAR) 7.5 MG tablet Take 1 tablet by mouth 2 (Two) Times a Day.      Krill Oil 1000 MG capsule Take  by mouth.      losartan (COZAAR) 50 MG tablet Take 1 tablet by mouth Daily. 30 tablet 5    nitroglycerin (NITROSTAT) 0.4 MG SL tablet 1 under the tongue as needed for angina, may repeat q5mins for up three doses 100 tablet 11    pantoprazole (PROTONIX) 40 MG EC tablet Take 1 tablet by mouth Daily.      pravastatin (PRAVACHOL) 40 MG tablet TAKE 1 TABLET BY MOUTH  ONCE DAILY 30 tablet 0    sertraline (ZOLOFT) 50 MG tablet Take 1 tablet by mouth Daily.      [DISCONTINUED] Alirocumab (Praluent) 75 MG/ML solution auto-injector Inject 1 mL under the skin into the appropriate area as directed Every 14 (Fourteen) Days. (Patient not taking: Reported on 1/23/2024) 6 mL 0     Current Facility-Administered Medications on File Prior to Visit   Medication Dose Route Frequency Provider Last Rate Last Admin    [DISCONTINUED] Alirocumab solution auto-injector 75 mg  75 mg Subcutaneous Q14 Days Naman Garcia MD        [DISCONTINUED] Alirocumab solution auto-injector 75 mg  75 mg Subcutaneous Q14 Days Naman Garcia MD           Plavix [clopidogrel] and Penicillins    Past Medical History:   Diagnosis Date    Coronary artery disease     Hyperlipidemia     Hypertension     Osteoarthritis        Social History     Socioeconomic History    Marital status:    Tobacco Use    Smoking status: Never    Smokeless tobacco: Never   Substance and Sexual Activity    Alcohol use: No    Drug use: No    Sexual activity: Defer       Family History   Problem Relation Age of Onset    Hyperlipidemia Other     Coronary artery disease Other         CABG    Diabetes Other     Heart attack Other     Hypertension Mother     Heart disease Mother     Hyperlipidemia Mother     Heart disease Father     Diabetes Father     Hypertension Father     Hyperlipidemia Father        Review of Systems   Constitutional: Negative.  Negative for chills, diaphoresis, fatigue and fever.   HENT: Negative.     Eyes: Negative.  Negative for visual disturbance.   Respiratory: Negative.  Negative for apnea, cough, chest tightness, shortness of breath and wheezing.    Cardiovascular: Negative.  Negative for chest pain, palpitations and leg swelling.   Gastrointestinal: Negative.  Negative for abdominal pain and blood in stool.   Endocrine: Negative.    Genitourinary: Negative.  Negative for hematuria.   Musculoskeletal: Negative.   "Negative for arthralgias, back pain, myalgias, neck pain and neck stiffness.   Skin: Negative.  Negative for rash and wound.   Allergic/Immunologic: Negative.  Negative for environmental allergies and food allergies.   Neurological: Negative.  Negative for dizziness, syncope, weakness, light-headedness, numbness and headaches.   Hematological: Negative.  Does not bruise/bleed easily.   Psychiatric/Behavioral: Negative.  Negative for sleep disturbance.        Objective   Vitals:    01/23/24 1518   Weight: 91.2 kg (201 lb)   Height: 170.2 cm (67\")      Ht 170.2 cm (67\")   Wt 91.2 kg (201 lb)   BMI 31.48 kg/m²    Lab Results (most recent)       None          Physical Exam  Vitals and nursing note reviewed.   Constitutional:       General: He is not in acute distress.     Appearance: He is well-developed.   HENT:      Head: Normocephalic and atraumatic.   Eyes:      Conjunctiva/sclera: Conjunctivae normal.      Pupils: Pupils are equal, round, and reactive to light.   Neck:      Vascular: No JVD.      Trachea: No tracheal deviation.   Cardiovascular:      Rate and Rhythm: Normal rate and regular rhythm.      Heart sounds: Normal heart sounds.   Pulmonary:      Effort: Pulmonary effort is normal.      Breath sounds: Normal breath sounds.   Abdominal:      General: Bowel sounds are normal. There is no distension.      Palpations: Abdomen is soft. There is no mass.      Tenderness: There is no abdominal tenderness. There is no guarding or rebound.   Musculoskeletal:         General: No tenderness or deformity. Normal range of motion.      Cervical back: Normal range of motion and neck supple.   Skin:     General: Skin is warm and dry.      Coloration: Skin is not pale.      Findings: No erythema or rash.   Neurological:      Mental Status: He is alert and oriented to person, place, and time.   Psychiatric:         Behavior: Behavior normal.         Thought Content: Thought content normal.         Judgment: Judgment " normal.           Procedure     ECG 12 Lead    Date/Time: 1/23/2024 3:23 PM  Performed by: Ronald Carlton PA    Authorized by: Ronald Carlton PA  Comparison: compared with previous ECG from 1/2/2020  Comparison to previous ECG: Sinus rhythm, rate 55, left axis deviation, minor nonspecific ST and T wave changes, no acute changes noted.             Assessment & Plan      Diagnosis Plan   1. Coronary artery disease due to calcified coronary lesion  Alirocumab (Praluent) 75 MG/ML solution auto-injector      2. Dyslipidemia  Alirocumab (Praluent) 75 MG/ML solution auto-injector      3. Essential hypertension            1.  At this time, the patient continues to do well.  He denies angina, failure, or dysrhythmic issues.  He reports that he feels well now as he has in some time.  I do not feel anything further is indicated.  We have discussed updated noninvasive studies.  He feels well enough that he would like to avoid that.  Should he change his mind or develops symptoms, he will return immediately.    2.  We did give him refills of Praluent.  That has now been sent to his pharmacy.  He will resume that with follow-up lipids recommended with his primary care provider in 2 to 3 months.    3.  Blood pressure parameters otherwise are well-controlled.  He is doing well on medical regimen otherwise.  I will make no further adjustments of medications.    4.  We will continue to see him on 6-month intervals, sooner for complications.               Electronically signed by:

## 2024-01-24 ENCOUNTER — TELEPHONE (OUTPATIENT)
Dept: CARDIOLOGY | Facility: CLINIC | Age: 54
End: 2024-01-24
Payer: COMMERCIAL

## 2024-01-24 VITALS
OXYGEN SATURATION: 97 % | HEIGHT: 67 IN | DIASTOLIC BLOOD PRESSURE: 93 MMHG | WEIGHT: 201 LBS | BODY MASS INDEX: 31.55 KG/M2 | HEART RATE: 64 BPM | SYSTOLIC BLOOD PRESSURE: 140 MMHG

## 2024-01-24 NOTE — TELEPHONE ENCOUNTER
Praluent no longer preferred or approved by insurance. Patient prefers Repatha two week auto injector. New RX sent per Ronald Carlton PA-C.     Explained it will still require a prior authorization and will be notified when ready for . He is aware of $10 copay card for commercially insured patients, available online, at the pharmacy or can be mailed.

## 2024-10-02 ENCOUNTER — OFFICE VISIT (OUTPATIENT)
Dept: CARDIOLOGY | Facility: CLINIC | Age: 54
End: 2024-10-02
Payer: COMMERCIAL

## 2024-10-02 VITALS
SYSTOLIC BLOOD PRESSURE: 131 MMHG | DIASTOLIC BLOOD PRESSURE: 84 MMHG | HEART RATE: 68 BPM | OXYGEN SATURATION: 97 % | WEIGHT: 200 LBS | BODY MASS INDEX: 31.39 KG/M2 | HEIGHT: 67 IN

## 2024-10-02 DIAGNOSIS — E78.5 DYSLIPIDEMIA: ICD-10-CM

## 2024-10-02 DIAGNOSIS — I25.84 CORONARY ARTERY DISEASE DUE TO CALCIFIED CORONARY LESION: Primary | ICD-10-CM

## 2024-10-02 DIAGNOSIS — I25.10 CORONARY ARTERY DISEASE DUE TO CALCIFIED CORONARY LESION: Primary | ICD-10-CM

## 2024-10-02 DIAGNOSIS — I10 ESSENTIAL HYPERTENSION: ICD-10-CM

## 2024-10-02 PROCEDURE — 99213 OFFICE O/P EST LOW 20 MIN: CPT | Performed by: PHYSICIAN ASSISTANT

## 2024-10-02 NOTE — PROGRESS NOTES
Problem list     Subjective   Salvador Panda is a 54 y.o. male     Chief Complaint   Patient presents with    Follow-up     8 month follow up    Coronary Artery Disease   Problem List:  1. Coronary artery disease with history of stenting to the LAD, November 2007.  2. Hypertension      3. Dyslipidemia    HPI  The patient presents to the clinic today for follow-up.  For the most part, the patient has done well since last evaluation.  He denies any angina.  He has baseline dyspnea.  He has no failure nor dysrhythmic issues.  He is typically normotensive.  The patient has no further complaints otherwise and feels that he is doing well.    Current Outpatient Medications on File Prior to Visit   Medication Sig Dispense Refill    aspirin 325 MG EC tablet Take  by mouth daily.      busPIRone (BUSPAR) 7.5 MG tablet Take 1 tablet by mouth Daily.      Evolocumab (REPATHA) solution auto-injector SureClick injection Inject 1 mL under the skin into the appropriate area as directed Every 14 (Fourteen) Days. 2 mL 11    Krill Oil 1000 MG capsule Take  by mouth.      losartan (COZAAR) 50 MG tablet Take 1 tablet by mouth Daily. 30 tablet 5    nitroglycerin (NITROSTAT) 0.4 MG SL tablet 1 under the tongue as needed for angina, may repeat q5mins for up three doses 100 tablet 11    pantoprazole (PROTONIX) 40 MG EC tablet Take 1 tablet by mouth Daily.      pravastatin (PRAVACHOL) 40 MG tablet TAKE 1 TABLET BY MOUTH ONCE DAILY 30 tablet 0    sertraline (ZOLOFT) 50 MG tablet Take 1 tablet by mouth Daily.       No current facility-administered medications on file prior to visit.       Plavix [clopidogrel] and Penicillins    Past Medical History:   Diagnosis Date    Coronary artery disease     Hyperlipidemia     Hypertension     Osteoarthritis        Social History     Socioeconomic History    Marital status:    Tobacco Use    Smoking status: Never    Smokeless tobacco: Never   Substance and Sexual Activity    Alcohol use: No    Drug  "use: No    Sexual activity: Defer       Family History   Problem Relation Age of Onset    Hyperlipidemia Other     Coronary artery disease Other         CABG    Diabetes Other     Heart attack Other     Hypertension Mother     Heart disease Mother     Hyperlipidemia Mother     Heart disease Father     Diabetes Father     Hypertension Father     Hyperlipidemia Father        Review of Systems   Constitutional: Negative.  Negative for chills, diaphoresis, fatigue and fever.   HENT: Negative.     Eyes: Negative.  Positive for visual disturbance (blurred vision).   Respiratory: Negative.  Negative for apnea, cough, chest tightness, shortness of breath and wheezing.    Cardiovascular: Negative.  Negative for chest pain, palpitations and leg swelling.   Gastrointestinal: Negative.  Negative for abdominal pain and blood in stool.   Endocrine: Negative.    Genitourinary: Negative.  Negative for hematuria.   Musculoskeletal: Negative.  Positive for back pain. Negative for arthralgias, myalgias, neck pain and neck stiffness.   Skin: Negative.  Negative for rash and wound.   Allergic/Immunologic: Positive for environmental allergies (seasonal). Negative for food allergies.   Neurological: Negative.  Negative for dizziness, syncope, weakness, light-headedness, numbness and headaches.   Hematological: Negative.  Does not bruise/bleed easily.   Psychiatric/Behavioral: Negative.  Negative for sleep disturbance.        Objective   Vitals:    10/02/24 1539   BP: 131/84   Pulse: 68   SpO2: 97%   Weight: 90.7 kg (200 lb)   Height: 170.2 cm (67\")      /84   Pulse 68   Ht 170.2 cm (67\")   Wt 90.7 kg (200 lb)   SpO2 97%   BMI 31.32 kg/m²    Lab Results (most recent)       None          Physical Exam  Vitals and nursing note reviewed.   Constitutional:       General: He is not in acute distress.     Appearance: He is well-developed.   HENT:      Head: Normocephalic and atraumatic.   Eyes:      Conjunctiva/sclera: Conjunctivae " normal.      Pupils: Pupils are equal, round, and reactive to light.   Neck:      Vascular: No JVD.      Trachea: No tracheal deviation.   Cardiovascular:      Rate and Rhythm: Normal rate and regular rhythm.      Heart sounds: Normal heart sounds.   Pulmonary:      Effort: Pulmonary effort is normal.      Breath sounds: Normal breath sounds.   Abdominal:      General: Bowel sounds are normal. There is no distension.      Palpations: Abdomen is soft. There is no mass.      Tenderness: There is no abdominal tenderness. There is no guarding or rebound.   Musculoskeletal:         General: No tenderness or deformity. Normal range of motion.      Cervical back: Normal range of motion and neck supple.   Skin:     General: Skin is warm and dry.      Coloration: Skin is not pale.      Findings: No erythema or rash.   Neurological:      Mental Status: He is alert and oriented to person, place, and time.   Psychiatric:         Behavior: Behavior normal.         Thought Content: Thought content normal.         Judgment: Judgment normal.           Procedure   Procedures       Assessment & Plan      Diagnosis Plan   1. Coronary artery disease due to calcified coronary lesion        2. Dyslipidemia        3. Essential hypertension          1.  At this time, the patient appears to be doing well.  He denies any angina.  He has no further cardiovascular symptoms or issues.    2.  Hypertensive and lipid parameters appear well-controlled on current medical regimen.  We will continue same without change.    3.  For any complications he will return immediately.  We will continue to see the patient on routine 6-month intervals, sooner for complications.                   Electronically signed by:

## 2025-01-27 RX ORDER — EVOLOCUMAB 140 MG/ML
INJECTION, SOLUTION SUBCUTANEOUS
Qty: 2 ML | Refills: 11 | Status: SHIPPED | OUTPATIENT
Start: 2025-01-27

## 2025-07-23 ENCOUNTER — OFFICE VISIT (OUTPATIENT)
Dept: CARDIOLOGY | Facility: CLINIC | Age: 55
End: 2025-07-23
Payer: COMMERCIAL

## 2025-07-23 VITALS
WEIGHT: 200 LBS | SYSTOLIC BLOOD PRESSURE: 101 MMHG | BODY MASS INDEX: 31.39 KG/M2 | HEIGHT: 67 IN | OXYGEN SATURATION: 97 % | HEART RATE: 77 BPM | DIASTOLIC BLOOD PRESSURE: 69 MMHG

## 2025-07-23 DIAGNOSIS — I25.84 CORONARY ARTERY DISEASE DUE TO CALCIFIED CORONARY LESION: Primary | ICD-10-CM

## 2025-07-23 DIAGNOSIS — I10 ESSENTIAL HYPERTENSION: ICD-10-CM

## 2025-07-23 DIAGNOSIS — I25.10 CORONARY ARTERY DISEASE DUE TO CALCIFIED CORONARY LESION: Primary | ICD-10-CM

## 2025-07-23 DIAGNOSIS — E78.5 DYSLIPIDEMIA: ICD-10-CM

## 2025-07-23 PROCEDURE — 99213 OFFICE O/P EST LOW 20 MIN: CPT | Performed by: PHYSICIAN ASSISTANT

## 2025-07-23 RX ORDER — TIRZEPATIDE 2.5 MG/.5ML
2.5 INJECTION, SOLUTION SUBCUTANEOUS WEEKLY
COMMUNITY

## 2025-07-23 NOTE — PROGRESS NOTES
Problem list     Subjective   Salvador Panda is a 54 y.o. male     Chief Complaint   Patient presents with    Follow-up     8 month follow up     Shortness of Breath     With activity   Problem List:  1. Coronary artery disease with history of stenting to the LAD, November 2007.  2. Hypertension      3. Dyslipidemia    HPI  The patient presents in the clinic today for routine evaluation and follow-up.  From his work, the patient is done well from cardiovascular standpoint since last appointment.  He currently denies chest pain.  He has baseline dyspnea.  He has no failure nor dysrhythmic issues.  He reports that exercise capacity is adequate and without limitation from cardiac standpoint.  He has no further complaints and feels that he is doing well.    Current Outpatient Medications on File Prior to Visit   Medication Sig Dispense Refill    aspirin 325 MG EC tablet Take  by mouth daily.      busPIRone (BUSPAR) 7.5 MG tablet Take 1 tablet by mouth Daily.      Krill Oil 1000 MG capsule Take  by mouth.      losartan (COZAAR) 50 MG tablet Take 1 tablet by mouth Daily. 30 tablet 5    nitroglycerin (NITROSTAT) 0.4 MG SL tablet 1 under the tongue as needed for angina, may repeat q5mins for up three doses 100 tablet 11    pantoprazole (PROTONIX) 40 MG EC tablet Take 1 tablet by mouth Daily.      pravastatin (PRAVACHOL) 40 MG tablet TAKE 1 TABLET BY MOUTH ONCE DAILY 30 tablet 0    Repatha SureClick solution auto-injector SureClick injection INJECT 1 ML EVERY 14 DAYS 2 mL 11    sertraline (ZOLOFT) 50 MG tablet Take 1 tablet by mouth Daily.      Tirzepatide-Weight Management (Zepbound) 2.5 MG/0.5ML solution auto-injector Inject 0.5 mL under the skin into the appropriate area as directed 1 (One) Time Per Week.       No current facility-administered medications on file prior to visit.       Plavix [clopidogrel] and Penicillins    Past Medical History:   Diagnosis Date    Coronary artery disease     Hyperlipidemia      "Hypertension     Osteoarthritis        Social History     Socioeconomic History    Marital status:    Tobacco Use    Smoking status: Never    Smokeless tobacco: Never   Vaping Use    Vaping status: Never Used   Substance and Sexual Activity    Alcohol use: No    Drug use: No    Sexual activity: Defer       Family History   Problem Relation Age of Onset    Hyperlipidemia Other     Coronary artery disease Other         CABG    Diabetes Other     Heart attack Other     Hypertension Mother     Heart disease Mother     Hyperlipidemia Mother     Heart disease Father     Diabetes Father     Hypertension Father     Hyperlipidemia Father        Review of Systems   Constitutional: Negative.  Negative for chills, diaphoresis, fatigue and fever.   HENT: Negative.  Negative for hearing loss.    Eyes: Negative.  Negative for visual disturbance.   Respiratory: Negative.  Negative for apnea, cough, chest tightness, shortness of breath and wheezing.    Cardiovascular: Negative.  Negative for chest pain, palpitations and leg swelling.   Gastrointestinal: Negative.  Negative for abdominal pain and blood in stool.   Endocrine: Negative.    Genitourinary: Negative.  Negative for hematuria.   Musculoskeletal:  Positive for back pain. Negative for arthralgias, myalgias, neck pain and neck stiffness.   Skin: Negative.  Negative for rash and wound.   Allergic/Immunologic: Negative.  Negative for environmental allergies and food allergies.   Neurological: Negative.  Negative for dizziness, syncope, weakness, light-headedness, numbness and headaches.   Hematological: Negative.  Does not bruise/bleed easily.   Psychiatric/Behavioral: Negative.  Negative for sleep disturbance.        Objective   Vitals:    07/23/25 1502   BP: 101/69   Pulse: 77   SpO2: 97%   Weight: 90.7 kg (200 lb)   Height: 170.2 cm (67\")      /69   Pulse 77   Ht 170.2 cm (67\")   Wt 90.7 kg (200 lb)   SpO2 97%   BMI 31.32 kg/m²    Lab Results (most recent)  "      None          Physical Exam  Vitals and nursing note reviewed.   Constitutional:       General: He is not in acute distress.     Appearance: He is well-developed.   HENT:      Head: Normocephalic and atraumatic.   Eyes:      Conjunctiva/sclera: Conjunctivae normal.      Pupils: Pupils are equal, round, and reactive to light.   Neck:      Vascular: No JVD.      Trachea: No tracheal deviation.   Cardiovascular:      Rate and Rhythm: Normal rate and regular rhythm.      Heart sounds: Normal heart sounds.   Pulmonary:      Effort: Pulmonary effort is normal.      Breath sounds: Normal breath sounds.   Abdominal:      General: Bowel sounds are normal. There is no distension.      Palpations: Abdomen is soft. There is no mass.      Tenderness: There is no abdominal tenderness. There is no guarding or rebound.   Musculoskeletal:         General: No tenderness or deformity. Normal range of motion.      Cervical back: Normal range of motion and neck supple.   Skin:     General: Skin is warm and dry.      Coloration: Skin is not pale.      Findings: No erythema or rash.   Neurological:      Mental Status: He is alert and oriented to person, place, and time.   Psychiatric:         Behavior: Behavior normal.         Thought Content: Thought content normal.         Judgment: Judgment normal.           Procedure   Procedures       Assessment & Plan      Diagnosis Plan   1. Coronary artery disease due to calcified coronary lesion        2. Essential hypertension        3. Dyslipidemia          1.  At this time, the patient appears to be doing fairly well from general cardiovascular standpoint.  He denies any angina.  He has no further cardiovascular symptoms or complaints.    2.  Blood pressure and lipid parameters are well-maintained on current medical therapy.  No adjustments will be made.    3.  For any change in clinical course he will call immediately.  As he is doing well, nothing further and we will see him on routine  6 to 12-month intervals.           Salvador Panda  reports that he has never smoked. He has never used smokeless tobacco.         Electronically signed by: